# Patient Record
Sex: FEMALE | Race: WHITE | NOT HISPANIC OR LATINO | Employment: FULL TIME | URBAN - METROPOLITAN AREA
[De-identification: names, ages, dates, MRNs, and addresses within clinical notes are randomized per-mention and may not be internally consistent; named-entity substitution may affect disease eponyms.]

---

## 2018-01-17 ENCOUNTER — APPOINTMENT (OUTPATIENT)
Dept: WOMENS IMAGING | Facility: HOSPITAL | Age: 43
End: 2018-01-17

## 2018-01-17 PROCEDURE — MDREVSPNEW: Performed by: RADIOLOGY

## 2018-01-17 PROCEDURE — 77066 DX MAMMO INCL CAD BI: CPT | Performed by: RADIOLOGY

## 2018-01-17 PROCEDURE — 76641 ULTRASOUND BREAST COMPLETE: CPT | Performed by: RADIOLOGY

## 2018-01-17 PROCEDURE — 77062 BREAST TOMOSYNTHESIS BI: CPT | Performed by: RADIOLOGY

## 2018-01-17 PROCEDURE — G0279 TOMOSYNTHESIS, MAMMO: HCPCS | Performed by: RADIOLOGY

## 2018-04-10 ENCOUNTER — OFFICE VISIT (OUTPATIENT)
Dept: FAMILY MEDICINE CLINIC | Facility: CLINIC | Age: 43
End: 2018-04-10

## 2018-04-10 VITALS
HEIGHT: 68 IN | SYSTOLIC BLOOD PRESSURE: 116 MMHG | DIASTOLIC BLOOD PRESSURE: 78 MMHG | RESPIRATION RATE: 13 BRPM | OXYGEN SATURATION: 99 % | BODY MASS INDEX: 22.88 KG/M2 | WEIGHT: 151 LBS | HEART RATE: 68 BPM

## 2018-04-10 DIAGNOSIS — Z87.42 HISTORY OF MENORRHAGIA: ICD-10-CM

## 2018-04-10 DIAGNOSIS — Z13.220 SCREENING FOR HYPERLIPIDEMIA: ICD-10-CM

## 2018-04-10 DIAGNOSIS — Z00.00 ANNUAL PHYSICAL EXAM: Primary | ICD-10-CM

## 2018-04-10 DIAGNOSIS — R63.5 WEIGHT GAIN: ICD-10-CM

## 2018-04-10 DIAGNOSIS — Z23 NEED FOR VACCINATION: ICD-10-CM

## 2018-04-10 DIAGNOSIS — Z13.1 SCREENING FOR DIABETES MELLITUS: ICD-10-CM

## 2018-04-10 PROBLEM — L70.0 ACNE VULGARIS: Status: ACTIVE | Noted: 2018-04-10

## 2018-04-10 LAB
ALBUMIN SERPL-MCNC: 4.9 G/DL (ref 3.5–5.2)
ALBUMIN/GLOB SERPL: 2 G/DL
ALP SERPL-CCNC: 56 U/L (ref 39–117)
ALT SERPL-CCNC: 13 U/L (ref 1–33)
AST SERPL-CCNC: 14 U/L (ref 1–32)
BASOPHILS # BLD AUTO: 0.02 10*3/MM3 (ref 0–0.2)
BASOPHILS NFR BLD AUTO: 0.4 % (ref 0–1.5)
BILIRUB SERPL-MCNC: 0.6 MG/DL (ref 0.1–1.2)
BUN SERPL-MCNC: 13 MG/DL (ref 6–20)
BUN/CREAT SERPL: 14.1 (ref 7–25)
CALCIUM SERPL-MCNC: 9.4 MG/DL (ref 8.6–10.5)
CHLORIDE SERPL-SCNC: 100 MMOL/L (ref 98–107)
CHOLEST SERPL-MCNC: 191 MG/DL (ref 0–200)
CO2 SERPL-SCNC: 28.4 MMOL/L (ref 22–29)
CREAT SERPL-MCNC: 0.92 MG/DL (ref 0.57–1)
EOSINOPHIL # BLD AUTO: 0.04 10*3/MM3 (ref 0–0.7)
EOSINOPHIL NFR BLD AUTO: 0.8 % (ref 0.3–6.2)
ERYTHROCYTE [DISTWIDTH] IN BLOOD BY AUTOMATED COUNT: 13.9 % (ref 11.7–13)
GFR SERPLBLD CREATININE-BSD FMLA CKD-EPI: 67 ML/MIN/1.73
GFR SERPLBLD CREATININE-BSD FMLA CKD-EPI: 81 ML/MIN/1.73
GLOBULIN SER CALC-MCNC: 2.5 GM/DL
GLUCOSE SERPL-MCNC: 92 MG/DL (ref 65–99)
HBA1C MFR BLD: 5.34 % (ref 4.8–5.6)
HCT VFR BLD AUTO: 43 % (ref 35.6–45.5)
HDLC SERPL-MCNC: 65 MG/DL (ref 40–60)
HGB BLD-MCNC: 13.4 G/DL (ref 11.9–15.5)
IMM GRANULOCYTES # BLD: 0 10*3/MM3 (ref 0–0.03)
IMM GRANULOCYTES NFR BLD: 0 % (ref 0–0.5)
LDLC SERPL CALC-MCNC: 117 MG/DL (ref 0–100)
LDLC/HDLC SERPL: 1.79 {RATIO}
LYMPHOCYTES # BLD AUTO: 1.26 10*3/MM3 (ref 0.9–4.8)
LYMPHOCYTES NFR BLD AUTO: 26.4 % (ref 19.6–45.3)
MCH RBC QN AUTO: 32.3 PG (ref 26.9–32)
MCHC RBC AUTO-ENTMCNC: 31.2 G/DL (ref 32.4–36.3)
MCV RBC AUTO: 103.6 FL (ref 80.5–98.2)
MONOCYTES # BLD AUTO: 0.32 10*3/MM3 (ref 0.2–1.2)
MONOCYTES NFR BLD AUTO: 6.7 % (ref 5–12)
NEUTROPHILS # BLD AUTO: 3.14 10*3/MM3 (ref 1.9–8.1)
NEUTROPHILS NFR BLD AUTO: 65.7 % (ref 42.7–76)
PLATELET # BLD AUTO: 256 10*3/MM3 (ref 140–500)
POTASSIUM SERPL-SCNC: 4.2 MMOL/L (ref 3.5–5.2)
PROT SERPL-MCNC: 7.4 G/DL (ref 6–8.5)
RBC # BLD AUTO: 4.15 10*6/MM3 (ref 3.9–5.2)
SODIUM SERPL-SCNC: 140 MMOL/L (ref 136–145)
TRIGL SERPL-MCNC: 47 MG/DL (ref 0–150)
TSH SERPL DL<=0.005 MIU/L-ACNC: 1.25 MIU/ML (ref 0.27–4.2)
VLDLC SERPL CALC-MCNC: 9.4 MG/DL (ref 5–40)
WBC # BLD AUTO: 4.78 10*3/MM3 (ref 4.5–10.7)

## 2018-04-10 PROCEDURE — 90471 IMMUNIZATION ADMIN: CPT | Performed by: FAMILY MEDICINE

## 2018-04-10 PROCEDURE — 99386 PREV VISIT NEW AGE 40-64: CPT | Performed by: FAMILY MEDICINE

## 2018-04-10 PROCEDURE — 90715 TDAP VACCINE 7 YRS/> IM: CPT | Performed by: FAMILY MEDICINE

## 2018-04-10 NOTE — PROGRESS NOTES
Subjective   Geri Romeo is a 42 y.o. female. Patient is here today for   Chief Complaint   Patient presents with   • Annual Exam     Physical   • Establish Care         HPI    It has been about 3 yr since her last annual physical.  Her previous PCP went to a [facility] model a few years ago.      Health Habits:  Dental Exam. not up to date - about 9 months ago  Eye Exam. up to date  Exercise: 2 times/week.  Current exercise activities include: pure barre   Recent diagnostic mammogram WNL per pt report  She has had coffee with cream this morning, but no solid food, and she would like to get labs this morning  Last pap smear was normal about 3-4 yr ago  No hx of abnormal pap smears  She would like to return to clinic for a pap smear within the next 2 months (she needs to get to a work meeting and does not have time to do a pap today)  Nexplanon placed for menorrhagia in November 2017  She has gained about 8 lb since ht implant was placed  No menses in the last few months, which is a welcome change  She previously took Spironolactone for acne and would like to resume it (no Rx needed because she has left over medication at home)  No hx of STIs;  and monogamous  No mental health concerns  Flu shot is UTD but Tdap needed    Her  has shingles but she does not have any rash or burning/tingling.      The following portions of the patient's history were reviewed and updated as appropriate: allergies, current medications, past family history, past medical history, past social history, past surgical history and problem list.    Past Medical History:   Diagnosis Date   • Acne    • Allergic    • Menorrhagia       Past Surgical History:   Procedure Laterality Date   • APPENDECTOMY     • VAGINAL DELIVERY      x 2     Family History   Problem Relation Age of Onset   • Coronary artery disease Maternal Grandfather    • Glaucoma Paternal Grandfather        No Known Allergies     Social History     Social History Main  Topics   • Smoking status: Never Smoker   • Smokeless tobacco: Never Used   • Alcohol use Yes      Comment: 2-3 times a week   • Drug use: No   • Sexual activity: Yes     Partners: Male     Birth control/ protection: Implant      Comment:  has had a vascectomy     Social History Narrative    Works in [employer] for [employer]  Lives at home with her , 2 daughters, and 1 dog.        Medications:  Nexplanon implant  Spironolactone (dose unknown)       Review of Systems   Constitutional: Positive for unexpected weight change (8 lb gain in the last 6 months). Negative for appetite change and fever.   HENT: Negative for congestion, rhinorrhea and sore throat.    Eyes: Negative for pain and visual disturbance.   Respiratory: Negative for cough, chest tightness, shortness of breath and wheezing.    Cardiovascular: Negative for chest pain, palpitations and leg swelling.   Gastrointestinal: Negative for abdominal pain, blood in stool, constipation, diarrhea, nausea and vomiting.   Genitourinary: Negative for dysuria, genital sores and vaginal bleeding.        Menses have stopped with Nexplanon   Musculoskeletal: Negative for arthralgias and myalgias.   Skin: Negative for pallor and rash.   Neurological: Negative for dizziness and headaches.   Psychiatric/Behavioral: Negative for dysphoric mood. The patient is not nervous/anxious.          Objective       Vitals:    04/10/18 0902   BP: 116/78   Pulse: 68   Resp: 13   SpO2: 99%   BMI 23    Physical Exam   Constitutional: She appears well-developed and well-nourished. No distress.   HENT:   Head: Normocephalic and atraumatic.   Neck: No thyromegaly present.   Cardiovascular: Normal rate, regular rhythm and normal heart sounds.  Exam reveals no gallop and no friction rub.    No murmur heard.  Pulses:       Radial pulses are 2+ on the right side, and 2+ on the left side.   Pulmonary/Chest: Effort normal and breath sounds normal. No respiratory distress. She has no wheezes. She has  no rhonchi. She has no rales.   Abdominal: Soft. Bowel sounds are normal. She exhibits no distension. There is no tenderness.   Musculoskeletal: She exhibits no edema.   Lymphadenopathy:     She has no cervical adenopathy.   Neurological: She has normal strength. Gait normal.   Skin: Skin is warm and dry.   Psychiatric: She has a normal mood and affect. Her behavior is normal.       ASSESSMENT/PLAN          Visit Diagnoses     Annual physical exam    -  Primary    Relevant Orders    Tdap Vaccine Greater Than or Equal To 6yo IM    CBC & Differential    Comprehensive Metabolic Panel    Hemoglobin A1c    Lipid Panel With LDL / HDL Ratio    TSH    Screening for diabetes mellitus        Relevant Orders    Comprehensive Metabolic Panel    Hemoglobin A1c    Screening for hyperlipidemia        Relevant Orders    Lipid Panel With LDL / HDL Ratio    Weight gain        Relevant Orders    CBC & Differential    Comprehensive Metabolic Panel    Hemoglobin A1c    Lipid Panel With LDL / HDL Ratio    TSH    History of menorrhagia        Relevant Orders    CBC & Differential  TSH      Need for vaccination        Relevant Orders    Tdap Vaccine Greater Than or Equal To 6yo IM            Patient Instructions   Please forward a copy of your recent mammogram to this office.    Don't forget to schedule a dental exam soon.      Please call with your Spironolactone dose.          Return in about 1 month (around 5/10/2018) for pap smear.

## 2018-04-16 ENCOUNTER — TELEPHONE (OUTPATIENT)
Dept: FAMILY MEDICINE CLINIC | Facility: CLINIC | Age: 43
End: 2018-04-16

## 2018-04-16 NOTE — TELEPHONE ENCOUNTER
Called and spoke with patient to let her know her lab results and let her know that we will check her B12 at her next appointment.     ----- Message from Angi Fuller MD sent at 4/15/2018  4:19 PM EDT -----  Please contact pt and let her know that her red blood cells are enlarged, but that she is not anemic.  I would like to do some follow up blood tests (B12 and folate) to see if we can determine the cause of the red blood cell enlargement (macrocytosis).  She does not need to fast.  We can plan to draw the blood at her next appointment when she returns for a pap smear.  Cholesterol was very slightly elevated.  The rest of her labs look good; blood sugar,  kidney function, liver enzymes, and thyroid hormone were normal.

## 2019-02-28 RX ORDER — OSELTAMIVIR PHOSPHATE 75 MG/1
75 CAPSULE ORAL DAILY
Qty: 10 CAPSULE | Refills: 0 | Status: SHIPPED | OUTPATIENT
Start: 2019-02-28

## 2022-04-11 ENCOUNTER — APPOINTMENT (OUTPATIENT)
Dept: WOMENS IMAGING | Facility: HOSPITAL | Age: 47
End: 2022-04-11

## 2022-04-11 PROCEDURE — 77063 BREAST TOMOSYNTHESIS BI: CPT | Performed by: RADIOLOGY

## 2022-04-11 PROCEDURE — 77067 SCR MAMMO BI INCL CAD: CPT | Performed by: RADIOLOGY

## 2022-05-26 ENCOUNTER — LAB REQUISITION (OUTPATIENT)
Dept: LAB | Facility: HOSPITAL | Age: 47
End: 2022-05-26

## 2022-05-26 DIAGNOSIS — Z00.00 ENCOUNTER FOR GENERAL ADULT MEDICAL EXAMINATION WITHOUT ABNORMAL FINDINGS: ICD-10-CM

## 2022-05-26 PROCEDURE — 88305 TISSUE EXAM BY PATHOLOGIST: CPT | Performed by: OBSTETRICS & GYNECOLOGY

## 2022-05-28 LAB
LAB AP CASE REPORT: NORMAL
LAB AP CLINICAL INFORMATION: NORMAL
PATH REPORT.FINAL DX SPEC: NORMAL
PATH REPORT.GROSS SPEC: NORMAL

## 2024-01-26 ENCOUNTER — APPOINTMENT (OUTPATIENT)
Dept: WOMENS IMAGING | Facility: HOSPITAL | Age: 49
End: 2024-01-26
Payer: COMMERCIAL

## 2024-01-26 PROCEDURE — G0279 TOMOSYNTHESIS, MAMMO: HCPCS | Performed by: RADIOLOGY

## 2024-01-26 PROCEDURE — 77062 BREAST TOMOSYNTHESIS BI: CPT | Performed by: RADIOLOGY

## 2024-01-26 PROCEDURE — 77066 DX MAMMO INCL CAD BI: CPT | Performed by: RADIOLOGY

## 2024-01-26 PROCEDURE — 76642 ULTRASOUND BREAST LIMITED: CPT | Performed by: RADIOLOGY

## 2024-02-06 ENCOUNTER — APPOINTMENT (OUTPATIENT)
Dept: WOMENS IMAGING | Facility: HOSPITAL | Age: 49
End: 2024-02-06
Payer: COMMERCIAL

## 2024-02-06 ENCOUNTER — RESEARCH ENCOUNTER (OUTPATIENT)
Dept: ONCOLOGY | Facility: HOSPITAL | Age: 49
End: 2024-02-06
Payer: COMMERCIAL

## 2024-02-06 ENCOUNTER — LAB REQUISITION (OUTPATIENT)
Dept: LAB | Facility: HOSPITAL | Age: 49
End: 2024-02-06
Payer: COMMERCIAL

## 2024-02-06 DIAGNOSIS — N63.0 UNSPECIFIED LUMP IN UNSPECIFIED BREAST: ICD-10-CM

## 2024-02-06 PROCEDURE — 88305 TISSUE EXAM BY PATHOLOGIST: CPT | Performed by: OBSTETRICS & GYNECOLOGY

## 2024-02-06 PROCEDURE — 88112 CYTOPATH CELL ENHANCE TECH: CPT | Performed by: OBSTETRICS & GYNECOLOGY

## 2024-02-06 PROCEDURE — 76942 ECHO GUIDE FOR BIOPSY: CPT | Performed by: RADIOLOGY

## 2024-02-06 PROCEDURE — 19000 PUNCTURE ASPIR CYST BREAST: CPT | Performed by: RADIOLOGY

## 2024-02-06 NOTE — RESEARCH
Informed Consent worksheet for the protocol: [employer] 20-08 Prospective Case Collection Study for [employer]    Consent version: IRB approval dated November 3,2023  Protocol version: 8.0, IRB approval 08/25/2023  Subject -[id number]      The patient was pre screened by phone call and identified as a candidate for the [employer] 20-08 Prospective Case Collection Study for [employer], and voiced interest in participating in the study.    The following people were present at the consent conference: Location at the [facility]  Patient: Geri Romeo   : Kendra Santoyo   Other:       The following was discussed with the patient prior to signing the informed consent.    The study purpose   Procedures, including identification of an experimental investigational device  Duration of study participation  New Information  Risks and Discomforts  Any known side effects when applicable    Alternative treatment  Benefits   Costs/Payment   Patient's accountability and responsibilities    The voluntary nature of research participants     Right to withdraw consent    The withdrawal process    Confidentiality   Authorization to use and disclose information for research purposes - Including who can view information and the types of information shared.    The patient was informed of what to do in case of an illness or injury resulting from the study and who to contact for more trial information, or information on rights and welfare as a research volunteer.     The patient was given opportunity for questions. The patient verbalizes understanding and is willing to sign the informed consent.     After all questions were satisfied the patient signed the informed consent and a copy of the signed main informed consent form were given to the patient.    No study-specific procedures were completed prior to patient signing study informed consent form(s)    The principal  Investigator  is aware of the subject's participation status.    Kendra Santoyo   Clinical Research Coordinator

## 2024-02-09 LAB
DX PRELIMINARY: NORMAL
LAB AP CASE REPORT: NORMAL
LAB AP DIAGNOSIS COMMENT: NORMAL
PATH REPORT.FINAL DX SPEC: NORMAL
PATH REPORT.GROSS SPEC: NORMAL

## 2024-07-09 ENCOUNTER — APPOINTMENT (OUTPATIENT)
Dept: WOMENS IMAGING | Facility: HOSPITAL | Age: 49
End: 2024-07-09
Payer: COMMERCIAL

## 2024-07-09 PROCEDURE — 77065 DX MAMMO INCL CAD UNI: CPT | Performed by: RADIOLOGY

## 2024-07-09 PROCEDURE — 77061 BREAST TOMOSYNTHESIS UNI: CPT | Performed by: RADIOLOGY

## 2024-07-09 PROCEDURE — G0279 TOMOSYNTHESIS, MAMMO: HCPCS | Performed by: RADIOLOGY

## 2024-07-09 PROCEDURE — 76642 ULTRASOUND BREAST LIMITED: CPT | Performed by: RADIOLOGY

## 2024-07-22 ENCOUNTER — LAB REQUISITION (OUTPATIENT)
Dept: LAB | Facility: HOSPITAL | Age: 49
End: 2024-07-22
Payer: COMMERCIAL

## 2024-07-22 ENCOUNTER — APPOINTMENT (OUTPATIENT)
Dept: WOMENS IMAGING | Facility: HOSPITAL | Age: 49
End: 2024-07-22
Payer: COMMERCIAL

## 2024-07-22 DIAGNOSIS — R92.1 MAMMOGRAPHIC CALCIFICATION FOUND ON DIAGNOSTIC IMAGING OF BREAST: ICD-10-CM

## 2024-07-22 PROCEDURE — 88341 IMHCHEM/IMCYTCHM EA ADD ANTB: CPT | Performed by: OBSTETRICS & GYNECOLOGY

## 2024-07-22 PROCEDURE — 88305 TISSUE EXAM BY PATHOLOGIST: CPT | Performed by: OBSTETRICS & GYNECOLOGY

## 2024-07-22 PROCEDURE — 88342 IMHCHEM/IMCYTCHM 1ST ANTB: CPT | Performed by: OBSTETRICS & GYNECOLOGY

## 2024-07-22 PROCEDURE — A4648 IMPLANTABLE TISSUE MARKER: HCPCS | Performed by: RADIOLOGY

## 2024-07-22 PROCEDURE — C1819 TISSUE LOCALIZATION-EXCISION: HCPCS | Performed by: RADIOLOGY

## 2024-07-22 PROCEDURE — 19000 PUNCTURE ASPIR CYST BREAST: CPT | Performed by: RADIOLOGY

## 2024-07-22 PROCEDURE — 19081 BX BREAST 1ST LESION STRTCTC: CPT | Performed by: RADIOLOGY

## 2024-07-24 ENCOUNTER — TELEPHONE (OUTPATIENT)
Dept: SURGERY | Facility: CLINIC | Age: 49
End: 2024-07-24
Payer: COMMERCIAL

## 2024-07-24 DIAGNOSIS — N60.91 ATYPICAL DUCTAL HYPERPLASIA OF RIGHT BREAST: Primary | ICD-10-CM

## 2024-07-24 LAB
DX PRELIMINARY: NORMAL
LAB AP CASE REPORT: NORMAL
LAB AP CLINICAL INFORMATION: NORMAL
LAB AP SPECIAL STAINS: NORMAL
PATH REPORT.FINAL DX SPEC: NORMAL
PATH REPORT.GROSS SPEC: NORMAL

## 2024-07-29 ENCOUNTER — TELEPHONE (OUTPATIENT)
Dept: SURGERY | Facility: CLINIC | Age: 49
End: 2024-07-29
Payer: COMMERCIAL

## 2024-07-29 DIAGNOSIS — N60.91 ATYPICAL DUCTAL HYPERPLASIA OF RIGHT BREAST: Primary | ICD-10-CM

## 2024-07-29 NOTE — TELEPHONE ENCOUNTER
Called PT and left her a voicemail about the following appts.    MRI: 08/15/2024 at 10:00 AM    New PT appt: 08/21/2024 at 09:00 AM    Appt reminders and new PT paper work sent out in the mail on 07/29/2024.    MEN

## 2024-08-13 ENCOUNTER — TELEPHONE (OUTPATIENT)
Dept: SURGERY | Facility: CLINIC | Age: 49
End: 2024-08-13
Payer: COMMERCIAL

## 2024-08-13 NOTE — TELEPHONE ENCOUNTER
Called PT to reschedule her new PT appt with.    New PT appt: 08/28/2024 at 02:45 PM    Appt reminder sent out on 08/13/2024    PT gave a verbal understanding of appt date, time, and location.    MEN

## 2024-08-15 ENCOUNTER — HOSPITAL ENCOUNTER (OUTPATIENT)
Dept: MRI IMAGING | Facility: HOSPITAL | Age: 49
Discharge: HOME | End: 2024-08-15
Admitting: STUDENT IN AN ORGANIZED HEALTH CARE EDUCATION/TRAINING PROGRAM
Payer: COMMERCIAL

## 2024-08-15 DIAGNOSIS — N60.91 ATYPICAL DUCTAL HYPERPLASIA OF RIGHT BREAST: ICD-10-CM

## 2024-08-15 PROCEDURE — 77049 MRI BREAST C-+ W/CAD BI: CPT

## 2024-08-15 PROCEDURE — 0 GADOBENATE DIMEGLUMINE 529 MG/ML SOLUTION: Performed by: STUDENT IN AN ORGANIZED HEALTH CARE EDUCATION/TRAINING PROGRAM

## 2024-08-15 PROCEDURE — A9577 INJ MULTIHANCE: HCPCS | Performed by: STUDENT IN AN ORGANIZED HEALTH CARE EDUCATION/TRAINING PROGRAM

## 2024-08-15 RX ADMIN — GADOBENATE DIMEGLUMINE 14 ML: 529 INJECTION, SOLUTION INTRAVENOUS at 10:43

## 2024-08-19 ENCOUNTER — TELEPHONE (OUTPATIENT)
Dept: SURGERY | Facility: CLINIC | Age: 49
End: 2024-08-19
Payer: COMMERCIAL

## 2024-08-19 NOTE — TELEPHONE ENCOUNTER
Tried to call patient left a voicemail regarding MRI results and will discuss biopsy pathology and plan at her follow up appt.    [place]

## 2024-08-28 ENCOUNTER — TELEPHONE (OUTPATIENT)
Dept: SURGERY | Facility: CLINIC | Age: 49
End: 2024-08-28
Payer: COMMERCIAL

## 2024-08-28 ENCOUNTER — OFFICE VISIT (OUTPATIENT)
Dept: SURGERY | Facility: CLINIC | Age: 49
End: 2024-08-28
Payer: COMMERCIAL

## 2024-08-28 ENCOUNTER — HOSPITAL ENCOUNTER (OUTPATIENT)
Dept: SURGERY | Facility: HOSPITAL | Age: 49
Discharge: HOME | End: 2024-08-28
Admitting: STUDENT IN AN ORGANIZED HEALTH CARE EDUCATION/TRAINING PROGRAM
Payer: COMMERCIAL

## 2024-08-28 ENCOUNTER — TRANSCRIBE ORDERS (OUTPATIENT)
Dept: SURGERY | Facility: CLINIC | Age: 49
End: 2024-08-28

## 2024-08-28 VITALS
WEIGHT: 151 LBS | HEART RATE: 85 BPM | BODY MASS INDEX: 22.88 KG/M2 | RESPIRATION RATE: 18 BRPM | SYSTOLIC BLOOD PRESSURE: 124 MMHG | OXYGEN SATURATION: 97 % | DIASTOLIC BLOOD PRESSURE: 76 MMHG | HEIGHT: 68 IN

## 2024-08-28 DIAGNOSIS — N60.91 ATYPICAL DUCTAL HYPERPLASIA OF RIGHT BREAST: ICD-10-CM

## 2024-08-28 DIAGNOSIS — N60.91 ATYPICAL DUCTAL HYPERPLASIA OF RIGHT BREAST: Primary | ICD-10-CM

## 2024-08-28 DIAGNOSIS — N60.19 CYSTIC BREAST, UNSPECIFIED LATERALITY: ICD-10-CM

## 2024-08-28 DIAGNOSIS — N60.01 SOLITARY CYST OF RIGHT BREAST: ICD-10-CM

## 2024-08-28 PROCEDURE — 88112 CYTOPATH CELL ENHANCE TECH: CPT | Performed by: STUDENT IN AN ORGANIZED HEALTH CARE EDUCATION/TRAINING PROGRAM

## 2024-08-28 PROCEDURE — 88305 TISSUE EXAM BY PATHOLOGIST: CPT | Performed by: STUDENT IN AN ORGANIZED HEALTH CARE EDUCATION/TRAINING PROGRAM

## 2024-08-28 NOTE — LETTER
August 29, 2024     Gisele Britton MD  [address]  [address]  [facility] [address]    Patient: Geri Romeo   YOB: 1975   Date of Visit: 8/28/2024     Dear Gisele Britton MD:       Thank you for referring Geri Romeo to me for evaluation. Below are the relevant portions of my assessment and plan of care.    If you have questions, please do not hesitate to call me. I look forward to following Geri along with you.         Sincerely,        Coleen Nunn MD        CC: No Recipients    Coleen Nunn MD  08/29/24 1005  Sign when Signing Visit  [facility]     Referring Provider: Yanet Chaidez MD     Chief complaint: newly diagnosed atypical hyperplasia     Subjective  HPI: Ms. Geri Romeo is a 48 y.o. yo woman, seen at the request of Yanet Chaidez MD, for a new diagnosis of  right breast ADH.      This was initially detected as an imaging abnormality on imaging for new palpable mass which was found to be a collection of cysts. She has a history of cystic breasts requiring aspirations.    She denies any pain, skin changes, or nipple discharge.  She endorses a new palpable mass in her right superior breast that she first noticed over the weekend. Has been sore in that area but no other issues.     She is otherwise healthy and denies any medications regularly.     She denies any family history of breast or ovarian cancer.     She was joined today in clinic by her .  She gave consent for her  to be present during her examination and participate in the discussion.    Review of Systems   Constitutional: Negative.  Negative for appetite change, fatigue and fever.   HENT:  Negative.     Eyes: Negative.    Respiratory: Negative.  Negative for cough and shortness of breath.    Cardiovascular: Negative.    Gastrointestinal: Negative.  Negative for abdominal distention, diarrhea and nausea.   Endocrine: Negative.    Genitourinary: Negative.     Musculoskeletal: Negative.   Negative for arthralgias and back pain.   Skin: Negative.    Neurological: Negative.  Negative for dizziness, headaches and speech difficulty.   Hematological: Negative.    Psychiatric/Behavioral: Negative.  Negative for decreased concentration and sleep disturbance.        Medications:  No current outpatient medications on file.    Allergies:  No Known Allergies    Medical history:  Past Medical History:   Diagnosis Date   • Acne    • Allergic    • Menorrhagia    • Scalp mass        Surgical History:  Past Surgical History:   Procedure Laterality Date   • APPENDECTOMY     • OTHER SURGICAL HISTORY      scalp mass resection   • VAGINAL DELIVERY      x 2       Family History:  Family History   Problem Relation Age of Onset   • Coronary artery disease Maternal Grandfather    • Glaucoma Paternal Grandfather        Cancer Family History: Age of diagnosis/Age at death OR Age as of today  Breast Cancer: Denies  Ovarian Cancer: Denies  Pancreatic Cancer: Denies  Prostate Cancer: Denies  Colon Cancer: Denies  Lung Cancer: Denies     Social History:   Social History     Socioeconomic History   • Marital status: Unknown   Tobacco Use   • Smoking status: Never   • Smokeless tobacco: Never   Vaping Use   • Vaping status: Never Used   Substance and Sexual Activity   • Alcohol use: Yes     Comment: 2-3 times a week   • Drug use: No   • Sexual activity: Yes     Partners: Male     Birth control/protection: Implant     Comment:  has had a vascectomy       She lives with her  and 2 kids.   She is employed at [employer].       GYNECOLOGIC HISTORY:   . P: 2. AB: 0.  Last menstrual period: Had ablation, Unsure.  Age at menarche: 12  Age at first childbirth: 26  Lactation: Yes, Unsure of how long.  Age at menopause: N/A  Total years of oral contraceptive use: 4  Total years of hormone replacement therapy: 0      Objective  PHYSICAL EXAMINATION  This exam was chaperoned by: Ainsley  /76   Pulse 85   Resp 18   Ht 172.7  "cm (67.99\")   Wt 68.5 kg (151 lb)   SpO2 97%   BMI 22.96 kg/m²   ECOG 0 - Asymptomatic  General: NAD, well appearing  Psych: a&o x 3, normal mood and affect  Eyes: EOMI, no scleral icterus  ENMT: neck supple without masses or thyromegaly, mucus membranes moist  Resp: normal effort  CV: RRR, no edema   GI: soft, NT, ND  MSK: normal gait, normal ROM in bilateral shoulders  Lymph nodes:  no cervical, supraclavicular or axillary lymphadenopathy  Breast: symmetric, large volume, ptotic. Dense breast tissue in anterior breast, nodular.   Right: bulging mass in upper inner quadrant near areolar edge. No  skin changes, or nipple abnormalities.  Left:  No visible abnormalities on inspection while seated, with arms raised or hands on hips. No masses, skin changes, or nipple abnormalities.           Imaging - documented images below have been personally reviewed:  1/26/2024 bilateral diagnostic mammogram ([facility])  Breasts are extremely dense which lowers sensitivity of mammography  Finding 1: Isodense oval mass obscured margins seen in the left breast at 6:00 6 cm of the nipple.  Mass correlates to the palpable abnormality in the left breast at 6:00  Finding 2: Isodense oval masses with circumscribed margins seen in both breast.  Some have enlarged in size and some of decreased in size since prior exams.  This is a typically benign pattern consistent with a history of cyst  Left breast ultrasound  Finding 1 ultrasound shows an oval partially complex mixed cystic and solid lesion with partially defined margins measuring 10 x 9 x 7 mm in the left breast at 6:00 6 cm in the nipple  Finding 3: Oval parallel anechoic simple cyst with circumscribed margins in the left breast at 5:00 5 cm the nipple.  Impression  Finding 1: Complex mixed cystic and solid lesion the left breast is suspicious ultrasound-guided biopsy is recommended  Finding 2: Mass in both breast are benign negative  Finding 3: Simple cyst in the left breast at 5:00 5 " centers from the nipple is benign negative  BI-RADS 4B    2/6/2024 ultrasound-guided cyst aspiration  Patient's left breast was prepped and the cyst was visualized under ultrasound guidance.  A 21-gauge needle was inserted into the 6:00 cyst and 1/2 cc of yellow fluid extended in the syringe.  The walls collapsed.  There was complete resolution of the cyst.  The fluid was sent for cytology.  Cytology showed generated metaplastic cells, foamy histiocytes and neutrophils  Impression-cytology included degenerated metaplastic cells, foamy histiocytes, neutrophils, and cystic debris's.  A 6-month follow-up ultrasound is recommended    7/9/2024 right diagnostic mammogram: Palpable lump or thickening in the right breast and post cyst aspiration in the left breast at 6:00.  The breast is extremely dense  Finding 1: Multiple oval masses of varying size obscured margins in the right breast  Finding 2: Oval mass with obscured margins in the right breast, correlates with the pinpointed palpable area at 1230 o'clock  Finding 3: Amorphous calcifications measuring 5 mm with grouped distribution in the posterior one third region of the right breast at 10:00.  Bilateral breast ultrasound  Finding 2: Cluster of large simple cyst measuring at least 53 x 25 x 55 mm seen in the right breast  No evidence of any solid masses or other abnormalities in the left breast  Impression:  Finding 1-multiple similar masses in the right breast are stable and benign  Finding 2-palpable masses in the right breast are benign, aspiration can be performed for symptom relief  Finding 3-calcifications in the right breast are suspicious stereotactic biopsy is recommended  BI-RADS 4A    7/22/2024 stereotactic biopsy  The patient's right breast at 10 o'clock position was isolated in the stereotactic unit and the calcifications were localized.  Using an inferior approach 9 core biopsy specimens were obtained using a 9 gauge vacuum-assisted device.  The  specimens were radiographed confirming calcifications within the sample.  A Top-Hat shaped biopsy clip was deployed within the biopsy bed and the needle was withdrawn.  2 view mammogram shows clip migrated approximately 3 cm anterior in the ML view from expected biopsy site and the small to moderate size hematoma exists.  Impression  Technically successful right stereotactic biopsy, mircocalcifications with ADH and FEA. Pathology is concordant.    7/22/2024 right ultrasound-guided cyst aspiration  The right breast at 12:00 cluster of cyst were identified and using an 18-gauge needle 45 mL of yellow fluid extended into the syringe.  The walls of the cyst collapsed there was complete sonographic resolution.  The fluid was discarded.  Patient tolerated the procedure well  Impression: Technically successful right ultrasound cyst aspiration as described    8/15/24 breast MRI  There is heterogeneous fibroglandular tissue. There is marked  background parenchymal enhancement, right greater than left, which limits the sensitivity for detection of invasive malignancy and DCIS.   There are numerous bilateral breast cysts, some of which demonstrate intrinsic T1 hyperintense signal consistent with complicated proteinaceous/hemorrhagic cysts. Additionally, there is intrinsic T1 hyperintense signal within multiple ducts in both breasts, right greater than left, consistent with proteinaceous and/or hemorrhagic contents.     RIGHT BREAST:    At 8-9 o'clock in the posterior right breast, 10 cm posterior to the  nipple, there is a 1.5 cm AP dimension, 1.7 cm transverse dimension, 1.5vcm craniocaudal dimension T1 hyperintense postbiopsy hematoma representing the site of biopsy-proven atypia. A focus of susceptibility from a biopsy clip (TOPHAT) is located at 9:00 in the posterior right breast, 9.4 cm posterior to the nipple, approximately 0.8 cm anterior and superior to the superior aspect of the postbiopsy hematoma. The clip was  previously noted to be displaced from the biopsy site on the postbiopsy mammogram. No suspicious enhancing mass or area of non-mass enhancement is identified at the biopsy site or elsewhere within the right breast.  The visualized axilla is within normal limits.     LEFT BREAST:    No suspicious enhancing mass or area of non-mass enhancement is  identified.  The visualized axilla is within normal limits.     EXTRAMAMMARY FINDINGS:  There are no pathologically enlarged internal mammary chain lymph nodes on either side.    There are incompletely assessed T2 hyperintense hepatic lesions.     IMPRESSION AND RECOMMENDATION:  1.  A 1.7 cm post biopsy hematoma at 8-9 o'clock in the posterior right  breast represents the site of biopsy-proven atypia. The biopsy clip is  located approximately 0.8 cm from the periphery of the hematoma and was noted to be displaced on postbiopsy mammogram. Recommend surgical management with preoperative localization targeting the biopsy site.  2.  No MRI evidence of malignancy in the left breast.  BI-RADS Category 4:    Pathology:  7/22/24  1.  Breast, right, 10:00, for calcifications, biopsy (Top-Hat clip): Breast tissue with                -A.  Atypical ductal hyperplasia               -B.  Flat epithelial atypia               -C.  Microcalcifications associated with the atypical ductal hyperplasia, flat epithelial atypia and nonneoplastic tissue               -D.  Clustered cysts               -E.  Sclerosing adenosis               -F.  Fibroadenomatoid hyperplasia      8/28/24 Procedure  Diagnostic Ultrasound Breast, Targeted    Procedure: Diagnostic ultrasound,  Right breast  Indication: New enlarging lump in superior right breast   Comparisons: MRI 8/15/24,   Description of procedure: Using a 10MHz linear transducer, I scanned the breast at the area of interest.  Findings:  At 1:00, 4 cm FN, there is a hypoechoic, homogeneous mostly round but lobulated at the inferior edge and well  circumscribed margins cystic mass with posterior enhancement, measuring 3.6 x 3.4 cm in antiradial dimension and 3.4 x 5.2 cm in radial dimension. The lesion is oriented oblique and is in a similar region to the previous area of aspiration. It is compressible.       Impression:   Probably benign, 5.2 cm cystic lesion in the rightbreast at 1:00, 4 cm from the nipple. This is amenable to ultrasound-guided aspiration. Correlation with exam/MMG. BI-RADS 3.       Procedure: Ultrasound-guided needle aspiration of right breast cyst  Indication: Enlarging Breast cyst  Location: right breast, 1:00, 4 cm FN  Consent: The risks, benefits, and alternatives to the procedure were discussed with the patient, who understood and wished to proceed. The risks described included, but were not limited to, bleeding, infection, incomplete drainage and recurrence requiring repeat aspiration.  Description of Procedure: After the patient was positioned supine on the procedure table, I located the fluid collection using ultrasound as described above. The area was prepped with alcohol and draped in sterile fashion. I anesthetized the breast skin with 1% lidocaine with epinephrine. I then anesthetized the underlying subcutaneous tissue and breast parenchyma surrounding the lesion with 1% lidocaine with epinephrine under ultrasound visualization and guidance. I then inserted a 18 G needle (attached to a syringe) from a inferolateral to superomedial approach into the fluid collection under ultrasound guidance and aspirated fluid. Approximately 23 mL of thin brown fluid was aspirated and sent for cytology. The collection significantly decreased in size, however did not completely disappear.   Marker placed: none  Tolerance: The patient tolerated the procedure well.   Disposition: will call with cytology results. MRI with signs of hemorraghic cysts throughout both breasts but more in the right than left. Likely benign. Incomplete aspiration to  allow for repeat identification if suspicion on cytology.            Assessment & Plan  Assessment:  48 y.o. F with a new diagnosis of right breast ADH    Discussion:  We discussed the clinical significance of ADH in terms of the potential for identifying associated malignancy at time of excision as well as its being a risk marker for future breast cancer in either breast.     We discussed management options in terms of ADH being a potential precursor, high risk benign lesion including excisional biopsy to evaluate for associated malignancy as well as surveillance. The excisional biopsy procedure was explained. Potential risks of surgery including bleeding, infection, hematoma and scar were reviewed and we also discussed the potential need to return to the operating room in the event of close margins or malignancy were identified in the surgical specimen in which case a definitive cancer operation would be recommended.       We also reviewed that atypical ductal hyperplasia is considered a risk factor for future development of a breast cancer in either breast, with the associated risk being approximately 2-4-fold general population risk. We discussed chemoprevention as an effective means of risk reduction, and medical oncology consultation to discuss chemoprevention was recommended. We also discussed recommendation for high risk breast surveillance going forward, with goal of early detection should she develop a breast cancer. This would include annual mammography as well as annual breast mri, alternating every 6 months and combined with breast exams.            Plan:    - OR analilia guided guided partial mastectomy for ADH  - follow up cytology results from aspiration. History of multiple cyst aspirations, MRI and Mammogram/US's with multiple cysts.   - Next Mammogram will be due - 1/27/25 ([facility]) and MRIs will be due in August 2025  - defer referral to medical oncology for discussion of risk reduction until after  pathology from surgery available  -Consents completed and signed. Discussed the risks and benefits of right breast partial mastectomy, analilia guided at length including estimated time for procedure, postoperative recovery, and postoperative instructions. Discussed the risks to include pain, bleeding, infection, nerve injury, numbness, seroma, skin complications including necrosis, breast asymmetry, need for re-excision, and scar.  Patient appears to understand and wishes to proceed.  All questions were answered.  - liver cysts to be follow up by PCP/OBGYNs office          Coleen Nunn MD  Breast Surgical Oncology    I spent 45 minutes caring for Geri on this date of service. This time includes time spent by me in the following activities: preparing for the visit, reviewing tests, obtaining and/or reviewing a separately obtained history, performing a medically appropriate examination and/or evaluation , counseling and educating the patient/family/caregiver, ordering medications, tests, or procedures, referring and communicating with other health care professionals , documenting information in the medical record, independently interpreting results and communicating that information with the patient/family/caregiver, and care coordination.  I spent 10 minutes on the separately reported service of this clinic visit for completion of the diagnostic ultrasound and aspiration. This time is not included in the time used to support the E/M service also reported today.        CC:  MD Gisele Ovalle, acting PCP for patient

## 2024-08-28 NOTE — PROGRESS NOTES
[facility]     Referring Provider: Yanet Chaidez MD     Chief complaint: newly diagnosed atypical hyperplasia     Subjective   HPI: Ms. Geri Romeo is a 48 y.o. yo woman, seen at the request of Yanet Chaidez MD, for a new diagnosis of  right breast ADH.      This was initially detected as an imaging abnormality on imaging for new palpable mass which was found to be a collection of cysts. She has a history of cystic breasts requiring aspirations.    She denies any pain, skin changes, or nipple discharge.  She endorses a new palpable mass in her right superior breast that she first noticed over the weekend. Has been sore in that area but no other issues.     She is otherwise healthy and denies any medications regularly.     She denies any family history of breast or ovarian cancer.     She was joined today in clinic by her .  She gave consent for her  to be present during her examination and participate in the discussion.    Review of Systems   Constitutional: Negative.  Negative for appetite change, fatigue and fever.   HENT:  Negative.     Eyes: Negative.    Respiratory: Negative.  Negative for cough and shortness of breath.    Cardiovascular: Negative.    Gastrointestinal: Negative.  Negative for abdominal distention, diarrhea and nausea.   Endocrine: Negative.    Genitourinary: Negative.     Musculoskeletal: Negative.  Negative for arthralgias and back pain.   Skin: Negative.    Neurological: Negative.  Negative for dizziness, headaches and speech difficulty.   Hematological: Negative.    Psychiatric/Behavioral: Negative.  Negative for decreased concentration and sleep disturbance.        Medications:  No current outpatient medications on file.    Allergies:  No Known Allergies    Medical history:  Past Medical History:   Diagnosis Date    Acne     Allergic     Menorrhagia     Scalp mass        Surgical History:  Past Surgical History:   Procedure Laterality Date    APPENDECTOMY  "     OTHER SURGICAL HISTORY      scalp mass resection    VAGINAL DELIVERY      x 2       Family History:  Family History   Problem Relation Age of Onset    Coronary artery disease Maternal Grandfather     Glaucoma Paternal Grandfather        Cancer Family History: Age of diagnosis/Age at death OR Age as of today  Breast Cancer: Denies  Ovarian Cancer: Denies  Pancreatic Cancer: Denies  Prostate Cancer: Denies  Colon Cancer: Denies  Lung Cancer: Denies     Social History:   Social History     Socioeconomic History    Marital status: Unknown   Tobacco Use    Smoking status: Never    Smokeless tobacco: Never   Vaping Use    Vaping status: Never Used   Substance and Sexual Activity    Alcohol use: Yes     Comment: 2-3 times a week    Drug use: No    Sexual activity: Yes     Partners: Male     Birth control/protection: Implant     Comment:  has had a vascectomy       She lives with her  and 2 kids.   She is employed at [employer].       GYNECOLOGIC HISTORY:   . P: 2. AB: 0.  Last menstrual period: Had ablation, Unsure.  Age at menarche: 12  Age at first childbirth: 26  Lactation: Yes, Unsure of how long.  Age at menopause: N/A  Total years of oral contraceptive use: 4  Total years of hormone replacement therapy: 0      Objective   PHYSICAL EXAMINATION  This exam was chaperoned by: Ainsley  /76   Pulse 85   Resp 18   Ht 172.7 cm (67.99\")   Wt 68.5 kg (151 lb)   SpO2 97%   BMI 22.96 kg/m²   ECOG 0 - Asymptomatic  General: NAD, well appearing  Psych: a&o x 3, normal mood and affect  Eyes: EOMI, no scleral icterus  ENMT: neck supple without masses or thyromegaly, mucus membranes moist  Resp: normal effort  CV: RRR, no edema   GI: soft, NT, ND  MSK: normal gait, normal ROM in bilateral shoulders  Lymph nodes:  no cervical, supraclavicular or axillary lymphadenopathy  Breast: symmetric, large volume, ptotic. Dense breast tissue in anterior breast, nodular.   Right: bulging mass in upper inner quadrant " near areolar edge. No  skin changes, or nipple abnormalities.  Left:  No visible abnormalities on inspection while seated, with arms raised or hands on hips. No masses, skin changes, or nipple abnormalities.           Imaging - documented images below have been personally reviewed:  1/26/2024 bilateral diagnostic mammogram ([facility])  Breasts are extremely dense which lowers sensitivity of mammography  Finding 1: Isodense oval mass obscured margins seen in the left breast at 6:00 6 cm of the nipple.  Mass correlates to the palpable abnormality in the left breast at 6:00  Finding 2: Isodense oval masses with circumscribed margins seen in both breast.  Some have enlarged in size and some of decreased in size since prior exams.  This is a typically benign pattern consistent with a history of cyst  Left breast ultrasound  Finding 1 ultrasound shows an oval partially complex mixed cystic and solid lesion with partially defined margins measuring 10 x 9 x 7 mm in the left breast at 6:00 6 cm in the nipple  Finding 3: Oval parallel anechoic simple cyst with circumscribed margins in the left breast at 5:00 5 cm the nipple.  Impression  Finding 1: Complex mixed cystic and solid lesion the left breast is suspicious ultrasound-guided biopsy is recommended  Finding 2: Mass in both breast are benign negative  Finding 3: Simple cyst in the left breast at 5:00 5 centers from the nipple is benign negative  BI-RADS 4B    2/6/2024 ultrasound-guided cyst aspiration  Patient's left breast was prepped and the cyst was visualized under ultrasound guidance.  A 21-gauge needle was inserted into the 6:00 cyst and 1/2 cc of yellow fluid extended in the syringe.  The walls collapsed.  There was complete resolution of the cyst.  The fluid was sent for cytology.  Cytology showed generated metaplastic cells, foamy histiocytes and neutrophils  Impression-cytology included degenerated metaplastic cells, foamy histiocytes, neutrophils, and cystic  debris's.  A 6-month follow-up ultrasound is recommended    7/9/2024 right diagnostic mammogram: Palpable lump or thickening in the right breast and post cyst aspiration in the left breast at 6:00.  The breast is extremely dense  Finding 1: Multiple oval masses of varying size obscured margins in the right breast  Finding 2: Oval mass with obscured margins in the right breast, correlates with the pinpointed palpable area at 1230 o'clock  Finding 3: Amorphous calcifications measuring 5 mm with grouped distribution in the posterior one third region of the right breast at 10:00.  Bilateral breast ultrasound  Finding 2: Cluster of large simple cyst measuring at least 53 x 25 x 55 mm seen in the right breast  No evidence of any solid masses or other abnormalities in the left breast  Impression:  Finding 1-multiple similar masses in the right breast are stable and benign  Finding 2-palpable masses in the right breast are benign, aspiration can be performed for symptom relief  Finding 3-calcifications in the right breast are suspicious stereotactic biopsy is recommended  BI-RADS 4A    7/22/2024 stereotactic biopsy  The patient's right breast at 10 o'clock position was isolated in the stereotactic unit and the calcifications were localized.  Using an inferior approach 9 core biopsy specimens were obtained using a 9 gauge vacuum-assisted device.  The specimens were radiographed confirming calcifications within the sample.  A Top-Hat shaped biopsy clip was deployed within the biopsy bed and the needle was withdrawn.  2 view mammogram shows clip migrated approximately 3 cm anterior in the ML view from expected biopsy site and the small to moderate size hematoma exists.  Impression  Technically successful right stereotactic biopsy, mircocalcifications with ADH and FEA. Pathology is concordant.    7/22/2024 right ultrasound-guided cyst aspiration  The right breast at 12:00 cluster of cyst were identified and using an 18-gauge  needle 45 mL of yellow fluid extended into the syringe.  The walls of the cyst collapsed there was complete sonographic resolution.  The fluid was discarded.  Patient tolerated the procedure well  Impression: Technically successful right ultrasound cyst aspiration as described    8/15/24 breast MRI  There is heterogeneous fibroglandular tissue. There is marked  background parenchymal enhancement, right greater than left, which limits the sensitivity for detection of invasive malignancy and DCIS.   There are numerous bilateral breast cysts, some of which demonstrate intrinsic T1 hyperintense signal consistent with complicated proteinaceous/hemorrhagic cysts. Additionally, there is intrinsic T1 hyperintense signal within multiple ducts in both breasts, right greater than left, consistent with proteinaceous and/or hemorrhagic contents.     RIGHT BREAST:    At 8-9 o'clock in the posterior right breast, 10 cm posterior to the  nipple, there is a 1.5 cm AP dimension, 1.7 cm transverse dimension, 1.5vcm craniocaudal dimension T1 hyperintense postbiopsy hematoma representing the site of biopsy-proven atypia. A focus of susceptibility from a biopsy clip (TOPHAT) is located at 9:00 in the posterior right breast, 9.4 cm posterior to the nipple, approximately 0.8 cm anterior and superior to the superior aspect of the postbiopsy hematoma. The clip was previously noted to be displaced from the biopsy site on the postbiopsy mammogram. No suspicious enhancing mass or area of non-mass enhancement is identified at the biopsy site or elsewhere within the right breast.  The visualized axilla is within normal limits.     LEFT BREAST:    No suspicious enhancing mass or area of non-mass enhancement is  identified.  The visualized axilla is within normal limits.     EXTRAMAMMARY FINDINGS:  There are no pathologically enlarged internal mammary chain lymph nodes on either side.    There are incompletely assessed T2 hyperintense hepatic  lesions.     IMPRESSION AND RECOMMENDATION:  1.  A 1.7 cm post biopsy hematoma at 8-9 o'clock in the posterior right  breast represents the site of biopsy-proven atypia. The biopsy clip is  located approximately 0.8 cm from the periphery of the hematoma and was noted to be displaced on postbiopsy mammogram. Recommend surgical management with preoperative localization targeting the biopsy site.  2.  No MRI evidence of malignancy in the left breast.  BI-RADS Category 4:    Pathology:  7/22/24  1.  Breast, right, 10:00, for calcifications, biopsy (Top-Hat clip): Breast tissue with                -A.  Atypical ductal hyperplasia               -B.  Flat epithelial atypia               -C.  Microcalcifications associated with the atypical ductal hyperplasia, flat epithelial atypia and nonneoplastic tissue               -D.  Clustered cysts               -E.  Sclerosing adenosis               -F.  Fibroadenomatoid hyperplasia      8/28/24 Procedure  Diagnostic Ultrasound Breast, Targeted    Procedure: Diagnostic ultrasound,  Right breast  Indication: New enlarging lump in superior right breast   Comparisons: MRI 8/15/24,   Description of procedure: Using a 10MHz linear transducer, I scanned the breast at the area of interest.  Findings:  At 1:00, 4 cm FN, there is a hypoechoic, homogeneous mostly round but lobulated at the inferior edge and well circumscribed margins cystic mass with posterior enhancement, measuring 3.6 x 3.4 cm in antiradial dimension and 3.4 x 5.2 cm in radial dimension. The lesion is oriented oblique and is in a similar region to the previous area of aspiration. It is compressible.       Impression:   Probably benign, 5.2 cm cystic lesion in the rightbreast at 1:00, 4 cm from the nipple. This is amenable to ultrasound-guided aspiration. Correlation with exam/MMG. BI-RADS 3.       Procedure: Ultrasound-guided needle aspiration of right breast cyst  Indication: Enlarging Breast cyst  Location: right  breast, 1:00, 4 cm FN  Consent: The risks, benefits, and alternatives to the procedure were discussed with the patient, who understood and wished to proceed. The risks described included, but were not limited to, bleeding, infection, incomplete drainage and recurrence requiring repeat aspiration.  Description of Procedure: After the patient was positioned supine on the procedure table, I located the fluid collection using ultrasound as described above. The area was prepped with alcohol and draped in sterile fashion. I anesthetized the breast skin with 1% lidocaine with epinephrine. I then anesthetized the underlying subcutaneous tissue and breast parenchyma surrounding the lesion with 1% lidocaine with epinephrine under ultrasound visualization and guidance. I then inserted a 18 G needle (attached to a syringe) from a inferolateral to superomedial approach into the fluid collection under ultrasound guidance and aspirated fluid. Approximately 23 mL of thin brown fluid was aspirated and sent for cytology. The collection significantly decreased in size, however did not completely disappear.   Marker placed: none  Tolerance: The patient tolerated the procedure well.   Disposition: will call with cytology results. MRI with signs of hemorraghic cysts throughout both breasts but more in the right than left. Likely benign. Incomplete aspiration to allow for repeat identification if suspicion on cytology.            Assessment & Plan   Assessment:  48 y.o. F with a new diagnosis of right breast ADH    Discussion:  We discussed the clinical significance of ADH in terms of the potential for identifying associated malignancy at time of excision as well as its being a risk marker for future breast cancer in either breast.     We discussed management options in terms of ADH being a potential precursor, high risk benign lesion including excisional biopsy to evaluate for associated malignancy as well as surveillance. The excisional  biopsy procedure was explained. Potential risks of surgery including bleeding, infection, hematoma and scar were reviewed and we also discussed the potential need to return to the operating room in the event of close margins or malignancy were identified in the surgical specimen in which case a definitive cancer operation would be recommended.       We also reviewed that atypical ductal hyperplasia is considered a risk factor for future development of a breast cancer in either breast, with the associated risk being approximately 2-4-fold general population risk. We discussed chemoprevention as an effective means of risk reduction, and medical oncology consultation to discuss chemoprevention was recommended. We also discussed recommendation for high risk breast surveillance going forward, with goal of early detection should she develop a breast cancer. This would include annual mammography as well as annual breast mri, alternating every 6 months and combined with breast exams.            Plan:    - OR analilia guided guided partial mastectomy for ADH  - follow up cytology results from aspiration. History of multiple cyst aspirations, MRI and Mammogram/US's with multiple cysts.   - Next Mammogram will be due - 1/27/25 ([facility]) and MRIs will be due in August 2025  - defer referral to medical oncology for discussion of risk reduction until after pathology from surgery available  -Consents completed and signed. Discussed the risks and benefits of right breast partial mastectomy, analilia guided at length including estimated time for procedure, postoperative recovery, and postoperative instructions. Discussed the risks to include pain, bleeding, infection, nerve injury, numbness, seroma, skin complications including necrosis, breast asymmetry, need for re-excision, and scar.  Patient appears to understand and wishes to proceed.  All questions were answered.  - liver cysts to be follow up by PCP/OBGYNs office          Coleen Nunn  MD  Breast Surgical Oncology    I spent 45 minutes caring for Geri on this date of service. This time includes time spent by me in the following activities: preparing for the visit, reviewing tests, obtaining and/or reviewing a separately obtained history, performing a medically appropriate examination and/or evaluation , counseling and educating the patient/family/caregiver, ordering medications, tests, or procedures, referring and communicating with other health care professionals , documenting information in the medical record, independently interpreting results and communicating that information with the patient/family/caregiver, and care coordination.  I spent 10 minutes on the separately reported service of this clinic visit for completion of the diagnostic ultrasound and aspiration. This time is not included in the time used to support the E/M service also reported today.        CC:  MD Gisele Ovalle, acting PCP for patient

## 2024-08-29 ENCOUNTER — PREP FOR SURGERY (OUTPATIENT)
Dept: OTHER | Facility: HOSPITAL | Age: 49
End: 2024-08-29

## 2024-08-29 DIAGNOSIS — N60.91 ATYPICAL DUCTAL HYPERPLASIA OF RIGHT BREAST: Primary | ICD-10-CM

## 2024-08-29 PROBLEM — N60.19 CYSTIC BREAST, UNSPECIFIED LATERALITY: Status: ACTIVE | Noted: 2024-08-29

## 2024-08-29 PROBLEM — N60.01 SOLITARY CYST OF RIGHT BREAST: Status: ACTIVE | Noted: 2024-08-29

## 2024-08-30 ENCOUNTER — PATIENT ROUNDING (BHMG ONLY) (OUTPATIENT)
Dept: SURGERY | Facility: CLINIC | Age: 49
End: 2024-08-30
Payer: COMMERCIAL

## 2024-08-30 LAB
CYTO UR: NORMAL
LAB AP CASE REPORT: NORMAL
LAB AP CLINICAL INFORMATION: NORMAL
PATH REPORT.FINAL DX SPEC: NORMAL
PATH REPORT.GROSS SPEC: NORMAL

## 2024-09-03 ENCOUNTER — TELEPHONE (OUTPATIENT)
Dept: SURGERY | Facility: CLINIC | Age: 49
End: 2024-09-03
Payer: COMMERCIAL

## 2024-09-13 ENCOUNTER — PRE-ADMISSION TESTING (OUTPATIENT)
Dept: PREADMISSION TESTING | Facility: HOSPITAL | Age: 49
End: 2024-09-13
Payer: COMMERCIAL

## 2024-09-13 ENCOUNTER — APPOINTMENT (OUTPATIENT)
Dept: WOMENS IMAGING | Facility: HOSPITAL | Age: 49
End: 2024-09-13
Payer: COMMERCIAL

## 2024-09-13 VITALS
WEIGHT: 145 LBS | DIASTOLIC BLOOD PRESSURE: 75 MMHG | SYSTOLIC BLOOD PRESSURE: 124 MMHG | HEART RATE: 65 BPM | RESPIRATION RATE: 16 BRPM | HEIGHT: 68 IN | TEMPERATURE: 98.2 F | OXYGEN SATURATION: 99 % | BODY MASS INDEX: 21.98 KG/M2

## 2024-09-13 DIAGNOSIS — N60.91 ATYPICAL DUCTAL HYPERPLASIA OF RIGHT BREAST: ICD-10-CM

## 2024-09-13 LAB
ANION GAP SERPL CALCULATED.3IONS-SCNC: 9 MMOL/L (ref 5–15)
BASOPHILS # BLD AUTO: 0.01 10*3/MM3 (ref 0–0.2)
BASOPHILS NFR BLD AUTO: 0.1 % (ref 0–1.5)
BUN SERPL-MCNC: 16 MG/DL (ref 6–20)
BUN/CREAT SERPL: 21.9 (ref 7–25)
CALCIUM SPEC-SCNC: 9.9 MG/DL (ref 8.6–10.5)
CHLORIDE SERPL-SCNC: 100 MMOL/L (ref 98–107)
CO2 SERPL-SCNC: 27 MMOL/L (ref 22–29)
CREAT SERPL-MCNC: 0.73 MG/DL (ref 0.57–1)
DEPRECATED RDW RBC AUTO: 42 FL (ref 37–54)
EGFRCR SERPLBLD CKD-EPI 2021: 101 ML/MIN/1.73
EOSINOPHIL # BLD AUTO: 0 10*3/MM3 (ref 0–0.4)
EOSINOPHIL NFR BLD AUTO: 0 % (ref 0.3–6.2)
ERYTHROCYTE [DISTWIDTH] IN BLOOD BY AUTOMATED COUNT: 11.8 % (ref 12.3–15.4)
GLUCOSE SERPL-MCNC: 129 MG/DL (ref 65–99)
HCT VFR BLD AUTO: 39.4 % (ref 34–46.6)
HGB BLD-MCNC: 13.1 G/DL (ref 12–15.9)
IMM GRANULOCYTES # BLD AUTO: 0.03 10*3/MM3 (ref 0–0.05)
IMM GRANULOCYTES NFR BLD AUTO: 0.4 % (ref 0–0.5)
LYMPHOCYTES # BLD AUTO: 1.13 10*3/MM3 (ref 0.7–3.1)
LYMPHOCYTES NFR BLD AUTO: 14.9 % (ref 19.6–45.3)
MCH RBC QN AUTO: 32.3 PG (ref 26.6–33)
MCHC RBC AUTO-ENTMCNC: 33.2 G/DL (ref 31.5–35.7)
MCV RBC AUTO: 97.3 FL (ref 79–97)
MONOCYTES # BLD AUTO: 0.36 10*3/MM3 (ref 0.1–0.9)
MONOCYTES NFR BLD AUTO: 4.8 % (ref 5–12)
NEUTROPHILS NFR BLD AUTO: 6.04 10*3/MM3 (ref 1.7–7)
NEUTROPHILS NFR BLD AUTO: 79.8 % (ref 42.7–76)
NRBC BLD AUTO-RTO: 0 /100 WBC (ref 0–0.2)
PLATELET # BLD AUTO: 292 10*3/MM3 (ref 140–450)
PMV BLD AUTO: 9.6 FL (ref 6–12)
POTASSIUM SERPL-SCNC: 4.2 MMOL/L (ref 3.5–5.2)
RBC # BLD AUTO: 4.05 10*6/MM3 (ref 3.77–5.28)
SODIUM SERPL-SCNC: 136 MMOL/L (ref 136–145)
WBC NRBC COR # BLD AUTO: 7.57 10*3/MM3 (ref 3.4–10.8)

## 2024-09-13 PROCEDURE — 80048 BASIC METABOLIC PNL TOTAL CA: CPT

## 2024-09-13 PROCEDURE — 85025 COMPLETE CBC W/AUTO DIFF WBC: CPT

## 2024-09-13 PROCEDURE — A4648 IMPLANTABLE TISSUE MARKER: HCPCS | Performed by: RADIOLOGY

## 2024-09-13 PROCEDURE — 36415 COLL VENOUS BLD VENIPUNCTURE: CPT

## 2024-09-13 PROCEDURE — 19285 PERQ DEV BREAST 1ST US IMAG: CPT | Performed by: RADIOLOGY

## 2024-09-13 RX ORDER — DOXYCYCLINE HYCLATE 50 MG/1
50 CAPSULE ORAL AS NEEDED
COMMUNITY

## 2024-09-13 NOTE — DISCHARGE INSTRUCTIONS
Take the following medications the morning of surgery:  NONE      If you are on prescription narcotic pain medication to control your pain you may also take that medication the morning of surgery.      General Instructions:     Do not eat solid food after midnight the night before surgery.  Clear liquids day of surgery are allowed but must be stopped at least two hours before your hospital arrival time.       Allowed clear liquids      Water, sodas, and tea or coffee with no cream or milk added.       12 to 20 ounces of a clear liquid that contains carbohydrates is recommended.  If non-diabetic, have Gatorade or Powerade.  If diabetic, have G2 or Powerade Zero.     Do not have liquids red in color.  Do not consume chicken, beef, pork or vegetable broth or bouillon cubes of any variety as they are not considered clear liquids and are not allowed.      Infants may have breast milk up to four hours before surgery.  Infants drinking formula may drink formula up to six hours before surgery.   Patients who avoid smoking, chewing tobacco and alcohol for 4 weeks prior to surgery have a reduced risk of post-operative complications.  Quit smoking as many days before surgery as you can.  Do not smoke, use chewing tobacco or drink alcohol the day of surgery.   If applicable bring your C-PAP/ BI-PAP machine in with you to preop day of surgery.  Bring any papers given to you in the doctor’s office.  Wear clean comfortable clothes.  Do not wear contact lenses, false eyelashes or make-up.  Bring a case for your glasses.   Bring crutches or walker if applicable.  Remove all piercings.  Leave jewelry and any other valuables at home.  Hair extensions with metal clips must be removed prior to surgery.  The Pre-Admission Testing nurse will instruct you to bring medications if unable to obtain an accurate list in Pre-Admission Testing.          Preventing a Surgical Site Infection:  For 2 to 3 days before surgery, avoid shaving with a  razor because the razor can irritate skin and make it easier to develop an infection.    Any areas of open skin can increase the risk of a post-operative wound infection by allowing bacteria to enter and travel throughout the body.  Notify your surgeon if you have any skin wounds / rashes even if it is not near the expected surgical site.  The area will need assessed to determine if surgery should be delayed until it is healed.  The night prior to surgery shower using a fresh bar of anti-bacterial soap (such as Dial) and clean washcloth.  Sleep in a clean bed with clean clothing.  Do not allow pets to sleep with you.  Shower on the morning of surgery using a fresh bar of anti-bacterial soap (such as Dial) and clean washcloth.  Dry with a clean towel and dress in clean clothing.  Ask your surgeon if you will be receiving antibiotics prior to surgery.  Make sure you, your family, and all healthcare providers clean their hands with soap and water or an alcohol based hand  before caring for you or your wound.    Day of surgery:  Your arrival time is approximately two hours before your scheduled surgery time.  Please note if you have an early arrival time the surgery doors do not open before 5:00 AM.  Upon arrival, a Pre-op nurse and Anesthesiologist will review your health history, obtain vital signs, and answer questions you may have.  The only belongings needed at this time will be a list of your home medications and if applicable your C-PAP/BI-PAP machine.  A Pre-op nurse will start an IV and you may receive medication in preparation for surgery, including something to help you relax.     Please be aware that surgery does come with discomfort.  We want to make every effort to control your discomfort so please discuss any uncontrolled symptoms with your nurse.   Your doctor will most likely have prescribed pain medications.      If you are going home after surgery you will receive individualized written care  instructions before being discharged.  A responsible adult must drive you to and from the hospital on the day of your surgery and ideally stay with you through the night.   .  Discharge prescriptions can be filled by the hospital pharmacy during regular pharmacy hours.  If you are having surgery late in the day/evening your prescription may be e-prescribed to your pharmacy.  Please verify your pharmacy hours or chose a 24 hour pharmacy to avoid not having access to your prescription because your pharmacy has closed for the day.    If you are staying overnight following surgery, you will be transported to your hospital room following the recovery period.  [facility] has all private rooms.    If you have any questions please call Pre-Admission Testing at [phone].  Deductibles and co-payments are collected on the day of service. Please be prepared to pay the required co-pay, deductible or deposit on the day of service as defined by your plan.    Call your surgeon immediately if you experience any of the following symptoms:  Sore Throat  Shortness of Breath or difficulty breathing  Cough  Chills  Body soreness or muscle pain  Headache  Fever  New loss of taste or smell  Do not arrive for your surgery ill.  Your procedure will need to be rescheduled to another time.  You will need to call your physician before the day of surgery to avoid any unnecessary exposure to hospital staff as well as other patients.

## 2024-09-24 ENCOUNTER — TRANSCRIBE ORDERS (OUTPATIENT)
Dept: LAB | Facility: HOSPITAL | Age: 49
End: 2024-09-24
Payer: COMMERCIAL

## 2024-09-24 ENCOUNTER — ANCILLARY PROCEDURE (OUTPATIENT)
Dept: LAB | Facility: HOSPITAL | Age: 49
End: 2024-09-24
Payer: COMMERCIAL

## 2024-09-24 ENCOUNTER — ANESTHESIA (OUTPATIENT)
Dept: PERIOP | Facility: HOSPITAL | Age: 49
End: 2024-09-24
Payer: COMMERCIAL

## 2024-09-24 ENCOUNTER — ANESTHESIA EVENT (OUTPATIENT)
Dept: PERIOP | Facility: HOSPITAL | Age: 49
End: 2024-09-24
Payer: COMMERCIAL

## 2024-09-24 ENCOUNTER — APPOINTMENT (OUTPATIENT)
Dept: GENERAL RADIOLOGY | Facility: HOSPITAL | Age: 49
End: 2024-09-24
Payer: COMMERCIAL

## 2024-09-24 ENCOUNTER — HOSPITAL ENCOUNTER (OUTPATIENT)
Facility: HOSPITAL | Age: 49
Setting detail: HOSPITAL OUTPATIENT SURGERY
Discharge: HOME | End: 2024-09-24
Attending: STUDENT IN AN ORGANIZED HEALTH CARE EDUCATION/TRAINING PROGRAM | Admitting: STUDENT IN AN ORGANIZED HEALTH CARE EDUCATION/TRAINING PROGRAM
Payer: COMMERCIAL

## 2024-09-24 VITALS
HEART RATE: 65 BPM | WEIGHT: 145 LBS | TEMPERATURE: 97.7 F | SYSTOLIC BLOOD PRESSURE: 119 MMHG | HEIGHT: 68 IN | OXYGEN SATURATION: 100 % | BODY MASS INDEX: 21.98 KG/M2 | DIASTOLIC BLOOD PRESSURE: 61 MMHG | RESPIRATION RATE: 16 BRPM

## 2024-09-24 DIAGNOSIS — N60.91 ATYPICAL DUCTAL HYPERPLASIA OF RIGHT BREAST: Primary | ICD-10-CM

## 2024-09-24 DIAGNOSIS — N60.91 ATYPICAL DUCTAL HYPERPLASIA OF RIGHT BREAST: ICD-10-CM

## 2024-09-24 LAB
B-HCG UR QL: NEGATIVE
EXPIRATION DATE: NORMAL
INTERNAL NEGATIVE CONTROL: NEGATIVE
INTERNAL POSITIVE CONTROL: POSITIVE
Lab: NORMAL

## 2024-09-24 PROCEDURE — 25010000002 PROPOFOL 1000 MG/100ML EMULSION: Performed by: NURSE ANESTHETIST, CERTIFIED REGISTERED

## 2024-09-24 PROCEDURE — 76098 X-RAY EXAM SURGICAL SPECIMEN: CPT

## 2024-09-24 PROCEDURE — 19301 PARTIAL MASTECTOMY: CPT | Performed by: STUDENT IN AN ORGANIZED HEALTH CARE EDUCATION/TRAINING PROGRAM

## 2024-09-24 PROCEDURE — 88307 TISSUE EXAM BY PATHOLOGIST: CPT | Performed by: STUDENT IN AN ORGANIZED HEALTH CARE EDUCATION/TRAINING PROGRAM

## 2024-09-24 PROCEDURE — 81025 URINE PREGNANCY TEST: CPT | Performed by: STUDENT IN AN ORGANIZED HEALTH CARE EDUCATION/TRAINING PROGRAM

## 2024-09-24 PROCEDURE — 25810000003 LACTATED RINGERS PER 1000 ML: Performed by: STUDENT IN AN ORGANIZED HEALTH CARE EDUCATION/TRAINING PROGRAM

## 2024-09-24 PROCEDURE — 25010000002 CEFAZOLIN PER 500 MG: Performed by: STUDENT IN AN ORGANIZED HEALTH CARE EDUCATION/TRAINING PROGRAM

## 2024-09-24 PROCEDURE — 25010000002 PROPOFOL 200 MG/20ML EMULSION: Performed by: NURSE ANESTHETIST, CERTIFIED REGISTERED

## 2024-09-24 PROCEDURE — 25010000002 MIDAZOLAM PER 1 MG: Performed by: STUDENT IN AN ORGANIZED HEALTH CARE EDUCATION/TRAINING PROGRAM

## 2024-09-24 PROCEDURE — 25010000002 BUPIVACAINE (PF) 0.5 % SOLUTION 10 ML VIAL: Performed by: STUDENT IN AN ORGANIZED HEALTH CARE EDUCATION/TRAINING PROGRAM

## 2024-09-24 PROCEDURE — 25010000002 FENTANYL CITRATE (PF) 50 MCG/ML SOLUTION: Performed by: NURSE ANESTHETIST, CERTIFIED REGISTERED

## 2024-09-24 RX ORDER — OXYCODONE AND ACETAMINOPHEN 7.5; 325 MG/1; MG/1
1 TABLET ORAL EVERY 4 HOURS PRN
Status: DISCONTINUED | OUTPATIENT
Start: 2024-09-24 | End: 2024-09-24 | Stop reason: HOSPADM

## 2024-09-24 RX ORDER — SODIUM CHLORIDE 0.9 % (FLUSH) 0.9 %
3 SYRINGE (ML) INJECTION EVERY 12 HOURS SCHEDULED
Status: DISCONTINUED | OUTPATIENT
Start: 2024-09-24 | End: 2024-09-24 | Stop reason: HOSPADM

## 2024-09-24 RX ORDER — SODIUM CHLORIDE, SODIUM LACTATE, POTASSIUM CHLORIDE, CALCIUM CHLORIDE 600; 310; 30; 20 MG/100ML; MG/100ML; MG/100ML; MG/100ML
9 INJECTION, SOLUTION INTRAVENOUS CONTINUOUS
Status: DISCONTINUED | OUTPATIENT
Start: 2024-09-24 | End: 2024-09-24 | Stop reason: HOSPADM

## 2024-09-24 RX ORDER — HYDROCODONE BITARTRATE AND ACETAMINOPHEN 5; 325 MG/1; MG/1
1 TABLET ORAL ONCE AS NEEDED
Status: DISCONTINUED | OUTPATIENT
Start: 2024-09-24 | End: 2024-09-24 | Stop reason: HOSPADM

## 2024-09-24 RX ORDER — NALOXONE HCL 0.4 MG/ML
0.2 VIAL (ML) INJECTION AS NEEDED
Status: DISCONTINUED | OUTPATIENT
Start: 2024-09-24 | End: 2024-09-24 | Stop reason: HOSPADM

## 2024-09-24 RX ORDER — SODIUM CHLORIDE 0.9 % (FLUSH) 0.9 %
3-10 SYRINGE (ML) INJECTION AS NEEDED
Status: DISCONTINUED | OUTPATIENT
Start: 2024-09-24 | End: 2024-09-24 | Stop reason: HOSPADM

## 2024-09-24 RX ORDER — HYDROMORPHONE HYDROCHLORIDE 1 MG/ML
0.5 INJECTION, SOLUTION INTRAMUSCULAR; INTRAVENOUS; SUBCUTANEOUS
Status: DISCONTINUED | OUTPATIENT
Start: 2024-09-24 | End: 2024-09-24 | Stop reason: HOSPADM

## 2024-09-24 RX ORDER — LIDOCAINE HYDROCHLORIDE 10 MG/ML
0.5 INJECTION, SOLUTION INFILTRATION; PERINEURAL ONCE AS NEEDED
Status: DISCONTINUED | OUTPATIENT
Start: 2024-09-24 | End: 2024-09-24 | Stop reason: HOSPADM

## 2024-09-24 RX ORDER — FLUMAZENIL 0.1 MG/ML
0.2 INJECTION INTRAVENOUS AS NEEDED
Status: DISCONTINUED | OUTPATIENT
Start: 2024-09-24 | End: 2024-09-24 | Stop reason: HOSPADM

## 2024-09-24 RX ORDER — IPRATROPIUM BROMIDE AND ALBUTEROL SULFATE 2.5; .5 MG/3ML; MG/3ML
3 SOLUTION RESPIRATORY (INHALATION) ONCE AS NEEDED
Status: DISCONTINUED | OUTPATIENT
Start: 2024-09-24 | End: 2024-09-24 | Stop reason: HOSPADM

## 2024-09-24 RX ORDER — MIDAZOLAM HYDROCHLORIDE 1 MG/ML
2 INJECTION INTRAMUSCULAR; INTRAVENOUS
Status: DISCONTINUED | OUTPATIENT
Start: 2024-09-24 | End: 2024-09-24 | Stop reason: HOSPADM

## 2024-09-24 RX ORDER — DROPERIDOL 2.5 MG/ML
0.62 INJECTION, SOLUTION INTRAMUSCULAR; INTRAVENOUS
Status: DISCONTINUED | OUTPATIENT
Start: 2024-09-24 | End: 2024-09-24 | Stop reason: HOSPADM

## 2024-09-24 RX ORDER — PROPOFOL 10 MG/ML
INJECTION, EMULSION INTRAVENOUS AS NEEDED
Status: DISCONTINUED | OUTPATIENT
Start: 2024-09-24 | End: 2024-09-24 | Stop reason: SURG

## 2024-09-24 RX ORDER — PROPOFOL 10 MG/ML
INJECTION, EMULSION INTRAVENOUS CONTINUOUS PRN
Status: DISCONTINUED | OUTPATIENT
Start: 2024-09-24 | End: 2024-09-24 | Stop reason: SURG

## 2024-09-24 RX ORDER — PROMETHAZINE HYDROCHLORIDE 25 MG/1
25 TABLET ORAL ONCE AS NEEDED
Status: DISCONTINUED | OUTPATIENT
Start: 2024-09-24 | End: 2024-09-24 | Stop reason: HOSPADM

## 2024-09-24 RX ORDER — FENTANYL CITRATE 50 UG/ML
INJECTION, SOLUTION INTRAMUSCULAR; INTRAVENOUS AS NEEDED
Status: DISCONTINUED | OUTPATIENT
Start: 2024-09-24 | End: 2024-09-24 | Stop reason: SURG

## 2024-09-24 RX ORDER — ONDANSETRON 2 MG/ML
4 INJECTION INTRAMUSCULAR; INTRAVENOUS ONCE AS NEEDED
Status: DISCONTINUED | OUTPATIENT
Start: 2024-09-24 | End: 2024-09-24 | Stop reason: HOSPADM

## 2024-09-24 RX ORDER — PROMETHAZINE HYDROCHLORIDE 25 MG/1
25 SUPPOSITORY RECTAL ONCE AS NEEDED
Status: DISCONTINUED | OUTPATIENT
Start: 2024-09-24 | End: 2024-09-24 | Stop reason: HOSPADM

## 2024-09-24 RX ORDER — EPHEDRINE SULFATE 50 MG/ML
INJECTION INTRAVENOUS AS NEEDED
Status: DISCONTINUED | OUTPATIENT
Start: 2024-09-24 | End: 2024-09-24 | Stop reason: SURG

## 2024-09-24 RX ORDER — DIPHENHYDRAMINE HYDROCHLORIDE 50 MG/ML
12.5 INJECTION INTRAMUSCULAR; INTRAVENOUS
Status: DISCONTINUED | OUTPATIENT
Start: 2024-09-24 | End: 2024-09-24 | Stop reason: HOSPADM

## 2024-09-24 RX ORDER — HYDRALAZINE HYDROCHLORIDE 20 MG/ML
5 INJECTION INTRAMUSCULAR; INTRAVENOUS
Status: DISCONTINUED | OUTPATIENT
Start: 2024-09-24 | End: 2024-09-24 | Stop reason: HOSPADM

## 2024-09-24 RX ORDER — FENTANYL CITRATE 50 UG/ML
50 INJECTION, SOLUTION INTRAMUSCULAR; INTRAVENOUS
Status: DISCONTINUED | OUTPATIENT
Start: 2024-09-24 | End: 2024-09-24 | Stop reason: HOSPADM

## 2024-09-24 RX ORDER — LABETALOL HYDROCHLORIDE 5 MG/ML
5 INJECTION, SOLUTION INTRAVENOUS
Status: DISCONTINUED | OUTPATIENT
Start: 2024-09-24 | End: 2024-09-24 | Stop reason: HOSPADM

## 2024-09-24 RX ORDER — LIDOCAINE HYDROCHLORIDE 20 MG/ML
INJECTION, SOLUTION INFILTRATION; PERINEURAL AS NEEDED
Status: DISCONTINUED | OUTPATIENT
Start: 2024-09-24 | End: 2024-09-24 | Stop reason: SURG

## 2024-09-24 RX ORDER — EPHEDRINE SULFATE 50 MG/ML
5 INJECTION, SOLUTION INTRAVENOUS ONCE AS NEEDED
Status: DISCONTINUED | OUTPATIENT
Start: 2024-09-24 | End: 2024-09-24 | Stop reason: HOSPADM

## 2024-09-24 RX ADMIN — MIDAZOLAM 2 MG: 1 INJECTION INTRAMUSCULAR; INTRAVENOUS at 08:26

## 2024-09-24 RX ADMIN — LIDOCAINE HYDROCHLORIDE 60 MG: 20 INJECTION, SOLUTION INFILTRATION; PERINEURAL at 09:23

## 2024-09-24 RX ADMIN — FENTANYL CITRATE 50 MCG: 50 INJECTION, SOLUTION INTRAMUSCULAR; INTRAVENOUS at 09:20

## 2024-09-24 RX ADMIN — PROPOFOL 140 MCG/KG/MIN: 10 INJECTION, EMULSION INTRAVENOUS at 09:23

## 2024-09-24 RX ADMIN — EPHEDRINE SULFATE 10 MG: 50 INJECTION INTRAVENOUS at 09:40

## 2024-09-24 RX ADMIN — PROPOFOL INJECTABLE EMULSION 30 MG: 10 INJECTION, EMULSION INTRAVENOUS at 09:23

## 2024-09-24 RX ADMIN — SODIUM CHLORIDE, POTASSIUM CHLORIDE, SODIUM LACTATE AND CALCIUM CHLORIDE 9 ML/HR: 600; 310; 30; 20 INJECTION, SOLUTION INTRAVENOUS at 08:26

## 2024-09-24 RX ADMIN — SODIUM CHLORIDE 2000 MG: 900 INJECTION INTRAVENOUS at 09:10

## 2024-09-25 DIAGNOSIS — N60.01 SOLITARY CYST OF RIGHT BREAST: Primary | ICD-10-CM

## 2024-09-26 ENCOUNTER — TELEPHONE (OUTPATIENT)
Dept: SURGERY | Facility: CLINIC | Age: 49
End: 2024-09-26
Payer: COMMERCIAL

## 2024-09-26 LAB
CYTO UR: NORMAL
LAB AP CASE REPORT: NORMAL
LAB AP DIAGNOSIS COMMENT: NORMAL
PATH REPORT.FINAL DX SPEC: NORMAL
PATH REPORT.GROSS SPEC: NORMAL

## 2024-10-10 ENCOUNTER — OFFICE VISIT (OUTPATIENT)
Dept: SURGERY | Facility: CLINIC | Age: 49
End: 2024-10-10
Payer: COMMERCIAL

## 2024-10-10 VITALS
HEIGHT: 68 IN | DIASTOLIC BLOOD PRESSURE: 76 MMHG | SYSTOLIC BLOOD PRESSURE: 128 MMHG | RESPIRATION RATE: 18 BRPM | WEIGHT: 145 LBS | HEART RATE: 66 BPM | BODY MASS INDEX: 21.98 KG/M2 | OXYGEN SATURATION: 98 %

## 2024-10-10 DIAGNOSIS — N60.91 ATYPICAL DUCTAL HYPERPLASIA OF RIGHT BREAST: Primary | ICD-10-CM

## 2024-10-10 DIAGNOSIS — Z12.31 VISIT FOR SCREENING MAMMOGRAM: ICD-10-CM

## 2024-10-10 PROCEDURE — 99024 POSTOP FOLLOW-UP VISIT: CPT | Performed by: STUDENT IN AN ORGANIZED HEALTH CARE EDUCATION/TRAINING PROGRAM

## 2024-10-10 NOTE — PROGRESS NOTES
Breast Surgical Oncology Post-Op Visit    Surgery: Right breast excisional biopsy  Subjective: No acute issues. Denies f/c/wnd issues. Intermittent use of pain medication.     O:  Vitals:    10/10/24 0814   BP: 128/76   Pulse: 66   Resp: 18   SpO2: 98%     Breast incision:  healing well without evidence of infection or significant seroma  Axillary Incision: healing well without evidence of infection or significant seroma    Pathology:   9/24/24  1.   Right breast, excisional biopsy:          A.  Negative for residual atypical ductal hyperplasia or flat epithelial atypia.          B.  Benign breast parenchyma with columnar cell change and associated microcalcifications.         C.  Biopsy site changes present.     2.  Right breast,  additional superior margin (inked and oriented), excision:         A.  Negative for residual atypical ductal hyperplasia or flat epithelial atypia.         B.  Benign breast parenchyma with focal columnar cell change and microcalcifications               associated with benign lobular units.         C.  Clip and biopsy site changes present.    Assessment: s/p right breast excisional biopsy, initially diagnosed with ADH, no residual atypia or more concerning lesion at the time of surgery, recovering well.  Discussed pathology at length as well as planned/possible adjuvant treatments.  All questions answered.    Plan:   - RTC 6 months for clinical breast exam with Timothy Andrews for High Risk screening/followup  - Next Mammogram will be due - 1/27/25 ([facility]) and MRIs will be due in August 2025   - Med Onc referral discussed chemoprevention          Coleen Nunn MD  Breast Surgical Oncology

## 2024-10-10 NOTE — LETTER
October 10, 2024     Gisele Britton MD  [address] [facility]  [address]  [facility] [address]    Patient: Geri Romeo   YOB: 1975   Date of Visit: 10/10/2024     Dear Gisele Britton MD:       Thank you for referring Geri Romeo to me for evaluation. Below are the relevant portions of my assessment and plan of care.    If you have questions, please do not hesitate to call me. I look forward to following Geri along with you.         Sincerely,        Coleen Nunn MD        CC: No Recipients    Coleen Nunn MD  10/10/24 1253  Sign when Signing Visit  Breast Surgical Oncology Post-Op Visit    Surgery: Right breast excisional biopsy  Subjective: No acute issues. Denies f/c/wnd issues. Intermittent use of pain medication.     O:  Vitals:    10/10/24 0814   BP: 128/76   Pulse: 66   Resp: 18   SpO2: 98%     Breast incision:  healing well without evidence of infection or significant seroma  Axillary Incision: healing well without evidence of infection or significant seroma    Pathology:   9/24/24  1.   Right breast, excisional biopsy:          A.  Negative for residual atypical ductal hyperplasia or flat epithelial atypia.          B.  Benign breast parenchyma with columnar cell change and associated microcalcifications.         C.  Biopsy site changes present.     2.  Right breast,  additional superior margin (inked and oriented), excision:         A.  Negative for residual atypical ductal hyperplasia or flat epithelial atypia.         B.  Benign breast parenchyma with focal columnar cell change and microcalcifications               associated with benign lobular units.         C.  Clip and biopsy site changes present.    Assessment: s/p right breast excisional biopsy, initially diagnosed with ADH, no residual atypia or more concerning lesion at the time of surgery, recovering well.  Discussed pathology at length as well as planned/possible adjuvant treatments.  All questions answered.    Plan:   - RTC 6  months for clinical breast exam with Timothy Andrews for High Risk screening/followup  - Next Mammogram will be due - 1/27/25 ([facility]) and MRIs will be due in August 2025   - Med Onc referral discussed chemoprevention          Coleen Nunn MD  Breast Surgical Oncology

## 2024-10-16 ENCOUNTER — TELEPHONE (OUTPATIENT)
Dept: SURGERY | Facility: CLINIC | Age: 49
End: 2024-10-16
Payer: COMMERCIAL

## 2024-10-16 NOTE — TELEPHONE ENCOUNTER
Patient called yesterday asking for a referral to be put in for [facility] to drain her seroma. I informed  of this, tried to call pt today left vm  is wanting to know if this drainage is from her surgery site. If so she would like her to come in to see us to do the aspiration.     [place]

## 2024-11-06 NOTE — PROGRESS NOTES
Appointment    REASON FOR CONSULTATION:   ADH, right breast                             REQUESTING PHYSICIAN: Coleen Nunn MD  RECORDS OBTAINED:  Records of the patients history including those from the electronic medical record were reviewed and summarized in detail.    HISTORY OF PRESENT ILLNESS:  The patient is a 49 y.o. year old female recently diagnosed right breast atypical ductal hyperplasia, here to discuss chemoprevention.  Oncologic history outlined below    Oncology history:  1/26/2024 bilateral diagnostic mammogram-breast extremely dense.  Finding 1 isodense oval mass obscured margins in left breast 6:00 6 cm from nipple.  Finding 2 isodense oval masses with circumscribed margin in both breast.  Left breast ultrasound-finding 1 ultrasound shows oval partially complex mixed cystic and solid lesion with partially defined margins measuring 10 x 9 x 7 mm in left breast at 6:00 6 cm from nipple.  Finding 3 oval parallel anechoic simple cyst with circumscribed margin in left breast 5 cm from nipple.  BI-RADS 4B.  2/6/2024 ultrasound-guided cyst aspiration  7/9/2024 right diagnostic mammogram-palpable lump or thickening in right breast and post cyst aspiration in left breast at 6:00.  Breast extremely dense.  Finding 1 multiple oval masses of varying size obscuring margins in right breast.  Finding 2 oval mass with obscured margin in right breast correlates with pinpointed palpable area at 1230 o'clock.  Finding 3 amorphous calcifications measuring 5 mm with grouped distribution in posterior one third of right breast at 10:00.  Bilateral breast ultrasound.  Finding 1 multiple similar masses in right breast stable and benign.  Finding 2 palpable masses in right breast benign aspiration can be performed for symptom relief.  Pending 3 calcification right breast suspicious biopsy recommended.  BI-RADS 4A.  7/22/2024 right breast ultrasound-guided cyst aspiration-benign  7/22/2024 right breast 10:00 biopsy for  calcifications-atypical ductal hyperplasia  8/15/2024 MRI bilateral breast: 1.7 cm postbiopsy hematoma at 8-9 o'clock in posterior right breast representing site of biopsy-proven atypia.  Biopsy clip located 0.8 cm from periphery of hematoma noted to be displaced on postbiopsy mammogram.  Normal MRI of left breast  2024 right breast excisional biopsy-negative for residual atypical ductal hyperplasia or flat epithelial atypia.    Gyn history:  Age at first childbirth 26   Age at menarche: 12  Lactation/how lon years   Age at menopause: had ablation, 2 years ago at the age of 47 years for irregular/heavy periods  Total years of oral contraceptive use: 5 years  Total years of hormone replacement therapy: never    Past Medical History:   Diagnosis Date    Atypical ductal hyperplasia of right breast     PONV (postoperative nausea and vomiting)     Rosacea      Past Surgical History:   Procedure Laterality Date    APPENDECTOMY      BREAST LUMPECTOMY Right 2024    Procedure: right breast partial mastectomy, analilia guided;  Surgeon: Coleen Nunn MD;  Location: [facility];  Service: General;  Laterality: Right;    CYST REMOVAL      SCALP    ENDOMETRIAL ABLATION      LACERATION REPAIR Right 2024    FOOT    VAGINAL DELIVERY      x 2       MEDICATIONS    Current Outpatient Medications:     doxycycline (VIBRAMYCIN) 50 MG capsule, Take 1 capsule by mouth As Needed., Disp: , Rfl:     ALLERGIES:   No Known Allergies    SOCIAL HISTORY:       Social History     Socioeconomic History    Marital status: Unknown   Tobacco Use    Smoking status: Never    Smokeless tobacco: Never   Vaping Use    Vaping status: Never Used   Substance and Sexual Activity    Alcohol use: Yes     Comment: OCCASIONAL    Drug use: No    Sexual activity: Yes     Partners: Male     Birth control/protection: Implant     Comment:  has had a vascectomy         FAMILY HISTORY:  Family History   Problem Relation Age of Onset    Coronary  "artery disease Maternal Grandfather     Glaucoma Paternal Grandfather     Malig Hyperthermia Neg Hx        REVIEW OF SYSTEMS:  Review of Systems   Constitutional: Negative.    HENT: Negative.     Respiratory: Negative.     Cardiovascular: Negative.    Gastrointestinal: Negative.    Genitourinary: Negative.    Musculoskeletal: Negative.    Hematological: Negative.               Vitals:    11/07/24 1041   BP: 122/65   Pulse: 71   Resp: 16   Temp: 98.1 °F (36.7 °C)   TempSrc: Oral   SpO2: 99%   Weight: 66.3 kg (146 lb 3.2 oz)   Height: 172.7 cm (67.99\")   PainSc: 0-No pain         11/7/2024    10:33 AM   Current Status   ECOG score 0        PHYSICAL EXAM:    Physical Exam  Constitutional:       Appearance: Normal appearance.   HENT:      Head: Normocephalic and atraumatic.      Mouth/Throat:      Mouth: Mucous membranes are moist.      Pharynx: Oropharynx is clear.   Eyes:      Extraocular Movements: Extraocular movements intact.      Pupils: Pupils are equal, round, and reactive to light.   Cardiovascular:      Rate and Rhythm: Normal rate and regular rhythm.   Pulmonary:      Effort: Pulmonary effort is normal.      Breath sounds: Normal breath sounds.   Chest:          Comments: Nodular tissue noted  Abdominal:      General: Bowel sounds are normal.      Palpations: Abdomen is soft.   Musculoskeletal:      Cervical back: Normal range of motion and neck supple.   Neurological:      General: No focal deficit present.      Mental Status: She is alert and oriented to person, place, and time.   Psychiatric:         Mood and Affect: Mood normal.         Behavior: Behavior normal.           RECENT LABS:        WBC   Date Value Ref Range Status   11/07/2024 4.93 3.40 - 10.80 10*3/mm3 Final   09/13/2024 7.57 3.40 - 10.80 10*3/mm3 Final   04/10/2018 4.78 4.50 - 10.70 10*3/mm3 Final     Hemoglobin   Date Value Ref Range Status   11/07/2024 13.6 12.0 - 15.9 g/dL Final   09/13/2024 13.1 12.0 - 15.9 g/dL Final   04/10/2018 13.4 " 11.9 - 15.5 g/dL Final     Platelets   Date Value Ref Range Status   11/07/2024 226 140 - 450 10*3/mm3 Final   09/13/2024 292 140 - 450 10*3/mm3 Final   04/10/2018 256 140 - 500 10*3/mm3 Final       Pathology:  Tissue Pathology Exam (07/22/2024 08:45)   Tissue Pathology Exam (09/24/2024 09:52)     Imaging:  MRI Breast Bilateral Diagnostic W WO Contrast (08/15/2024 10:56)     Assessment:  *Atypical ductal hyperplasia, right breast  7/9/2024 right diagnostic mammogram-palpable lump or thickening in right breast and post cyst aspiration in left breast at 6:00.  Breast extremely dense.  Finding 1 multiple oval masses of varying size obscuring margins in right breast.  Finding 2 oval mass with obscured margin in right breast correlates with pinpointed palpable area at 1230 o'clock.  Finding 3 amorphous calcifications measuring 5 mm with grouped distribution in posterior one third of right breast at 10:00.  Bilateral breast ultrasound.  Finding 1 multiple similar masses in right breast stable and benign.  Finding 2 palpable masses in right breast benign aspiration can be performed for symptom relief.  Pending 3 calcification right breast suspicious biopsy recommended.  BI-RADS 4A.  7/22/2024 right breast ultrasound-guided cyst aspiration-benign  7/22/2024 right breast 10:00 biopsy for calcifications-atypical ductal hyperplasia  8/15/2024 MRI bilateral breast: 1.7 cm postbiopsy hematoma at 8-9 o'clock in posterior right breast representing site of biopsy-proven atypia.  Biopsy clip located 0.8 cm from periphery of hematoma noted to be displaced on postbiopsy mammogram.  Normal MRI of left breast  9/24/2024 right breast excisional biopsy-negative for residual atypical ductal hyperplasia or flat epithelial atypia.  11/7/2024: Patient established care with me.  We discussed atypical ductal hyperplasia and atypical lobular hyperplasia carries a substantial increase in the risk of subsequent breast cancer (relative risk of 3.7  to 5.3). There is an increase in the risk of both ipsilateral and contralateral breast cancer. In a report from the Nurse's Health Study, 56% of cancers that developed in women with AH occurred in the ipsilateral breast. Invasive breast cancer is 3 times more likely to occur in the ipsilateral breast after ALH is diagnosed compared to the contralateral breast. The cumulative incidence of breast cancer over 30 years approached 35%. Tamoxifen given for 5 years can reduce the risk of breast cancer by 50%. Aromatase inhibitors such as anastrozole and exemestane can reduce the risk of invasive breast cancer by50% compared to placebo in patients with atypical hyperplasia. However, we do not have any data comparing aromatase inhibitors and tamoxifen in atypical hyperplasia. We discussed the side effects of anastrozole including joint pain, hot flashes, other menopausal symptoms and loss of bone mineral density. We also discussed the side effects of tamoxifen including hot flashes, changes in the lens of the eye, increased risk of clotting (0.8%-1.7%) and increased risk of uterine cancer (4%). We also discussed that tamoxifen can increase bone density and improve cholesterol and lipids.  Patient prefers to wait until she is in menopause before she considers chemoprevention    *Right breast nodularity/cyst  Ultrasound-guided referral for cyst aspiration placed at patient's request  Patient reported Dr. Nunn was going to place that referral.    Plan  Discussed chemoprevention as noted above.  Obtain FSH and LH for menopausal status today  Next Mammogram will be due - 1/27/25 ([facility]) and MRIs will be due in August 2025   Follow-up with high risk breast clinic as scheduled in April 2025  Ultrasound-guided right breast cyst aspiration to [facility]  Patient prefers to start chemoprevention menopause.  As such, discussed return to clinic in 2 years  Case also discussed with Dr. Jacques from our group      Addendum  11/8/2024-called the  patient to review her labs.  FSH 85, LH 46.8.  Currently in menopausal range.  We discussed follow-up.  Patient prefers to follow-up with me in March 2025 with repeat labs, to consider endocrine treatment for chemoprevention.

## 2024-11-07 ENCOUNTER — CONSULT (OUTPATIENT)
Dept: ONCOLOGY | Facility: CLINIC | Age: 49
End: 2024-11-07
Payer: COMMERCIAL

## 2024-11-07 ENCOUNTER — LAB (OUTPATIENT)
Dept: LAB | Facility: HOSPITAL | Age: 49
End: 2024-11-07
Payer: COMMERCIAL

## 2024-11-07 VITALS
TEMPERATURE: 98.1 F | WEIGHT: 146.2 LBS | HEART RATE: 71 BPM | OXYGEN SATURATION: 99 % | HEIGHT: 68 IN | RESPIRATION RATE: 16 BRPM | SYSTOLIC BLOOD PRESSURE: 122 MMHG | DIASTOLIC BLOOD PRESSURE: 65 MMHG | BODY MASS INDEX: 22.16 KG/M2

## 2024-11-07 DIAGNOSIS — N60.91 ATYPICAL DUCTAL HYPERPLASIA OF RIGHT BREAST: ICD-10-CM

## 2024-11-07 DIAGNOSIS — N60.91 ATYPICAL DUCTAL HYPERPLASIA OF RIGHT BREAST: Primary | ICD-10-CM

## 2024-11-07 DIAGNOSIS — R92.8 ABNORMAL FINDING ON BREAST IMAGING: ICD-10-CM

## 2024-11-07 DIAGNOSIS — Z98.890 S/P ENDOMETRIAL ABLATION: Primary | ICD-10-CM

## 2024-11-07 DIAGNOSIS — N60.19 CYSTIC BREAST, UNSPECIFIED LATERALITY: ICD-10-CM

## 2024-11-07 LAB
BASOPHILS # BLD AUTO: 0.04 10*3/MM3 (ref 0–0.2)
BASOPHILS NFR BLD AUTO: 0.8 % (ref 0–1.5)
DEPRECATED RDW RBC AUTO: 44.3 FL (ref 37–54)
EOSINOPHIL # BLD AUTO: 0.04 10*3/MM3 (ref 0–0.4)
EOSINOPHIL NFR BLD AUTO: 0.8 % (ref 0.3–6.2)
ERYTHROCYTE [DISTWIDTH] IN BLOOD BY AUTOMATED COUNT: 12.5 % (ref 12.3–15.4)
FSH SERPL-ACNC: 85.4 MIU/ML
HCT VFR BLD AUTO: 40.4 % (ref 34–46.6)
HGB BLD-MCNC: 13.6 G/DL (ref 12–15.9)
IMM GRANULOCYTES # BLD AUTO: 0.03 10*3/MM3 (ref 0–0.05)
IMM GRANULOCYTES NFR BLD AUTO: 0.6 % (ref 0–0.5)
LH SERPL-ACNC: 46.8 MIU/ML
LYMPHOCYTES # BLD AUTO: 1.68 10*3/MM3 (ref 0.7–3.1)
LYMPHOCYTES NFR BLD AUTO: 34.1 % (ref 19.6–45.3)
MCH RBC QN AUTO: 32.5 PG (ref 26.6–33)
MCHC RBC AUTO-ENTMCNC: 33.7 G/DL (ref 31.5–35.7)
MCV RBC AUTO: 96.4 FL (ref 79–97)
MONOCYTES # BLD AUTO: 0.36 10*3/MM3 (ref 0.1–0.9)
MONOCYTES NFR BLD AUTO: 7.3 % (ref 5–12)
NEUTROPHILS NFR BLD AUTO: 2.78 10*3/MM3 (ref 1.7–7)
NEUTROPHILS NFR BLD AUTO: 56.4 % (ref 42.7–76)
NRBC BLD AUTO-RTO: 0 /100 WBC (ref 0–0.2)
PLATELET # BLD AUTO: 226 10*3/MM3 (ref 140–450)
PMV BLD AUTO: 10.1 FL (ref 6–12)
RBC # BLD AUTO: 4.19 10*6/MM3 (ref 3.77–5.28)
WBC NRBC COR # BLD AUTO: 4.93 10*3/MM3 (ref 3.4–10.8)

## 2024-11-07 PROCEDURE — 83002 ASSAY OF GONADOTROPIN (LH): CPT | Performed by: STUDENT IN AN ORGANIZED HEALTH CARE EDUCATION/TRAINING PROGRAM

## 2024-11-07 PROCEDURE — 85025 COMPLETE CBC W/AUTO DIFF WBC: CPT

## 2024-11-07 PROCEDURE — 83001 ASSAY OF GONADOTROPIN (FSH): CPT | Performed by: STUDENT IN AN ORGANIZED HEALTH CARE EDUCATION/TRAINING PROGRAM

## 2024-11-07 PROCEDURE — 36415 COLL VENOUS BLD VENIPUNCTURE: CPT

## 2024-11-11 ENCOUNTER — TELEPHONE (OUTPATIENT)
Dept: ONCOLOGY | Facility: CLINIC | Age: 49
End: 2024-11-11
Payer: COMMERCIAL

## 2024-11-20 ENCOUNTER — APPOINTMENT (OUTPATIENT)
Dept: WOMENS IMAGING | Facility: HOSPITAL | Age: 49
End: 2024-11-20
Payer: COMMERCIAL

## 2024-11-20 ENCOUNTER — LAB REQUISITION (OUTPATIENT)
Dept: LAB | Facility: HOSPITAL | Age: 49
End: 2024-11-20
Payer: COMMERCIAL

## 2024-11-20 DIAGNOSIS — N63.0 UNSPECIFIED LUMP IN UNSPECIFIED BREAST: ICD-10-CM

## 2024-11-20 DIAGNOSIS — N63.11 MASS OF UPPER OUTER QUADRANT OF RIGHT BREAST: Primary | ICD-10-CM

## 2024-11-20 PROCEDURE — 88112 CYTOPATH CELL ENHANCE TECH: CPT | Performed by: STUDENT IN AN ORGANIZED HEALTH CARE EDUCATION/TRAINING PROGRAM

## 2024-11-20 PROCEDURE — 76642 ULTRASOUND BREAST LIMITED: CPT | Performed by: RADIOLOGY

## 2024-11-20 PROCEDURE — 88305 TISSUE EXAM BY PATHOLOGIST: CPT | Performed by: STUDENT IN AN ORGANIZED HEALTH CARE EDUCATION/TRAINING PROGRAM

## 2024-11-20 PROCEDURE — 19000 PUNCTURE ASPIR CYST BREAST: CPT | Performed by: RADIOLOGY

## 2024-11-21 ENCOUNTER — TELEPHONE (OUTPATIENT)
Dept: SURGERY | Facility: CLINIC | Age: 49
End: 2024-11-21
Payer: COMMERCIAL

## 2024-11-21 ENCOUNTER — TELEPHONE (OUTPATIENT)
Dept: ONCOLOGY | Facility: CLINIC | Age: 49
End: 2024-11-21
Payer: COMMERCIAL

## 2024-11-21 NOTE — TELEPHONE ENCOUNTER
----- Message from Tory Jenkins sent at 11/21/2024  9:16 AM EST -----  Let the patient know right breast cyst aspiration was negative for any cancer

## 2024-11-21 NOTE — TELEPHONE ENCOUNTER
Left message to let patient know her cyst aspiration was negative for any cancer cells. Requested that she call back to RN direct line, [phone]

## 2024-11-21 NOTE — TELEPHONE ENCOUNTER
Called and spoke with patient let her know her cyst aspiration that was done yesterday at [facility] came back with benign cyst contents.      [place]

## 2024-11-21 NOTE — TELEPHONE ENCOUNTER
Received call back from Geri lettmarco antonio RN know she had received the message and was grateful for the call.

## 2024-11-22 LAB
DX PRELIMINARY: NORMAL
LAB AP CASE REPORT: NORMAL
PATH REPORT.FINAL DX SPEC: NORMAL
PATH REPORT.GROSS SPEC: NORMAL

## 2025-01-30 ENCOUNTER — PREP FOR SURGERY (OUTPATIENT)
Dept: SURGERY | Facility: SURGERY CENTER | Age: 50
End: 2025-01-30
Payer: COMMERCIAL

## 2025-01-30 ENCOUNTER — TELEPHONE (OUTPATIENT)
Dept: INTERNAL MEDICINE | Facility: CLINIC | Age: 50
End: 2025-01-30

## 2025-01-30 ENCOUNTER — TELEPHONE (OUTPATIENT)
Dept: GASTROENTEROLOGY | Facility: CLINIC | Age: 50
End: 2025-01-30
Payer: COMMERCIAL

## 2025-01-30 ENCOUNTER — OFFICE VISIT (OUTPATIENT)
Dept: INTERNAL MEDICINE | Facility: CLINIC | Age: 50
End: 2025-01-30
Payer: COMMERCIAL

## 2025-01-30 VITALS
BODY MASS INDEX: 22.19 KG/M2 | HEART RATE: 60 BPM | TEMPERATURE: 98 F | HEIGHT: 68 IN | OXYGEN SATURATION: 99 % | DIASTOLIC BLOOD PRESSURE: 70 MMHG | SYSTOLIC BLOOD PRESSURE: 104 MMHG | WEIGHT: 146.4 LBS

## 2025-01-30 DIAGNOSIS — Z83.719 FAMILY HISTORY OF COLONIC POLYPS: ICD-10-CM

## 2025-01-30 DIAGNOSIS — Z00.00 ENCOUNTER FOR WELLNESS EXAMINATION IN ADULT: ICD-10-CM

## 2025-01-30 DIAGNOSIS — Z12.11 ENCOUNTER FOR SCREENING COLONOSCOPY: Primary | ICD-10-CM

## 2025-01-30 DIAGNOSIS — L71.9 ROSACEA: Chronic | ICD-10-CM

## 2025-01-30 DIAGNOSIS — Z76.89 ENCOUNTER TO ESTABLISH CARE: Primary | ICD-10-CM

## 2025-01-30 DIAGNOSIS — Z12.11 SCREENING FOR MALIGNANT NEOPLASM OF COLON: ICD-10-CM

## 2025-01-30 DIAGNOSIS — N60.91 ATYPICAL DUCTAL HYPERPLASIA OF RIGHT BREAST: ICD-10-CM

## 2025-01-30 PROBLEM — Z87.42 HISTORY OF MENORRHAGIA: Status: RESOLVED | Noted: 2018-04-10 | Resolved: 2025-01-30

## 2025-01-30 PROCEDURE — 99386 PREV VISIT NEW AGE 40-64: CPT | Performed by: NURSE PRACTITIONER

## 2025-01-30 RX ORDER — SODIUM CHLORIDE, SODIUM LACTATE, POTASSIUM CHLORIDE, CALCIUM CHLORIDE 600; 310; 30; 20 MG/100ML; MG/100ML; MG/100ML; MG/100ML
30 INJECTION, SOLUTION INTRAVENOUS CONTINUOUS PRN
OUTPATIENT
Start: 2025-01-30 | End: 2025-01-31

## 2025-01-30 RX ORDER — SODIUM CHLORIDE 0.9 % (FLUSH) 0.9 %
3 SYRINGE (ML) INJECTION EVERY 12 HOURS SCHEDULED
OUTPATIENT
Start: 2025-01-30

## 2025-01-30 RX ORDER — SODIUM CHLORIDE 0.9 % (FLUSH) 0.9 %
10 SYRINGE (ML) INJECTION AS NEEDED
OUTPATIENT
Start: 2025-01-30

## 2025-01-30 NOTE — PROGRESS NOTES
"Subjective    Geri Romeo is a 49 y.o. female presenting today for   Chief Complaint   Patient presents with    Establish Care    Annual Exam         Geri Romeo presents today as a new patient to me to establish care.     Prior PCP was no one for many years.    Patient Care Team:  Asuncion Baugh APRN as PCP - General (Family Medicine)  Andre Foote MD as Consulting Physician (Plastic Surgery)  Coleen Nunn MD as Referring Physician (Breast Surgery)  Tory Jenkins MD as Consulting Physician (Hematology and Oncology)  [facility] (Gynecology)  Yakelin Franks MD as Consulting Physician (Dermatology)      Current/chronic health conditions include:    Patient Active Problem List   Diagnosis    Acne vulgaris    Cystic breast, unspecified laterality    Solitary cyst of right breast    Atypical ductal hyperplasia of right breast    Rosacea       Outpatient Medications Marked as Taking for the 1/30/25 encounter (Office Visit) with Asuncion Baugh APRN   Medication Sig Dispense Refill    doxycycline (VIBRAMYCIN) 50 MG capsule Take 1 capsule by mouth As Needed.           Nutrition: mostly fish and poultry; fruits, veggies  Exercise: \"I go in fits and spurts.\" 2x/wk walking. Started hot yoga at [employer].    PAP: per GYN  Mammo: UTD; f/b breast surgery  CRC: no prior screening  Lung: no h/o tobacco          The following portions of the patient's history were reviewed and updated as appropriate: allergies, current medications, problem list, past medical history, past surgical history, family history, and social history.     Review of Systems   Constitutional: Negative.    HENT: Negative.     Eyes: Negative.    Respiratory: Negative.     Cardiovascular: Negative.    Gastrointestinal: Negative.    Endocrine: Negative.    Genitourinary: Negative.    Musculoskeletal: Negative.    Skin: Negative.    Neurological: Negative.    Hematological: Negative.    Psychiatric/Behavioral: Negative.   " "      Objective    Vitals:    01/30/25 0855   BP: 104/70   BP Location: Left arm   Patient Position: Sitting   Cuff Size: Adult   Pulse: 60   Temp: 98 °F (36.7 °C)   TempSrc: Infrared   SpO2: 99%   Weight: 66.4 kg (146 lb 6.4 oz)   Height: 172.7 cm (68\")     Body mass index is 22.26 kg/m².  Nursing notes and vitals reviewed.    Physical Exam  Constitutional:       General: She is not in acute distress.     Appearance: Normal appearance. She is well-developed.   HENT:      Head: Normocephalic.      Right Ear: Hearing, tympanic membrane, ear canal and external ear normal.      Left Ear: Hearing, tympanic membrane, ear canal and external ear normal.      Nose: Nose normal. No mucosal edema or rhinorrhea.      Mouth/Throat:      Mouth: Mucous membranes are moist.      Pharynx: Oropharynx is clear. Uvula midline.   Eyes:      General: Lids are normal.      Extraocular Movements: Extraocular movements intact.      Conjunctiva/sclera: Conjunctivae normal.      Pupils: Pupils are equal, round, and reactive to light.   Neck:      Thyroid: No thyroid mass or thyromegaly.   Cardiovascular:      Rate and Rhythm: Regular rhythm.      Pulses: Normal pulses.      Heart sounds: S1 normal and S2 normal. No murmur heard.     No friction rub. No gallop.   Pulmonary:      Effort: Pulmonary effort is normal.      Breath sounds: Normal breath sounds. No wheezing, rhonchi or rales.   Abdominal:      General: Bowel sounds are normal.      Palpations: Abdomen is soft.      Tenderness: There is no abdominal tenderness. There is no guarding.      Hernia: No hernia is present.   Musculoskeletal:         General: No deformity. Normal range of motion.      Cervical back: Normal range of motion and neck supple.   Lymphadenopathy:      Cervical: No cervical adenopathy.   Skin:     General: Skin is warm and dry.      Findings: No lesion or rash.   Neurological:      General: No focal deficit present.      Mental Status: She is alert and oriented to " person, place, and time.      Cranial Nerves: No cranial nerve deficit.      Sensory: No sensory deficit.      Motor: Motor function is intact.      Coordination: Coordination is intact.      Gait: Gait normal.      Deep Tendon Reflexes: Reflexes are normal and symmetric.   Psychiatric:         Attention and Perception: She is attentive.         Mood and Affect: Mood and affect normal.         Speech: Speech normal.         Behavior: Behavior normal.         Thought Content: Thought content normal.           Data Reviewed:    Recent Results (from the past 20 weeks)   Basic Metabolic Panel    Collection Time: 09/13/24  1:06 PM    Specimen: Blood   Result Value Ref Range    Glucose 129 (H) 65 - 99 mg/dL    BUN 16 6 - 20 mg/dL    Creatinine 0.73 0.57 - 1.00 mg/dL    Sodium 136 136 - 145 mmol/L    Potassium 4.2 3.5 - 5.2 mmol/L    Chloride 100 98 - 107 mmol/L    CO2 27.0 22.0 - 29.0 mmol/L    Calcium 9.9 8.6 - 10.5 mg/dL    BUN/Creatinine Ratio 21.9 7.0 - 25.0    Anion Gap 9.0 5.0 - 15.0 mmol/L    eGFR 101.0 >60.0 mL/min/1.73   CBC Auto Differential    Collection Time: 09/13/24  1:06 PM    Specimen: Blood   Result Value Ref Range    WBC 7.57 3.40 - 10.80 10*3/mm3    RBC 4.05 3.77 - 5.28 10*6/mm3    Hemoglobin 13.1 12.0 - 15.9 g/dL    Hematocrit 39.4 34.0 - 46.6 %    MCV 97.3 (H) 79.0 - 97.0 fL    MCH 32.3 26.6 - 33.0 pg    MCHC 33.2 31.5 - 35.7 g/dL    RDW 11.8 (L) 12.3 - 15.4 %    RDW-SD 42.0 37.0 - 54.0 fl    MPV 9.6 6.0 - 12.0 fL    Platelets 292 140 - 450 10*3/mm3    Neutrophil % 79.8 (H) 42.7 - 76.0 %    Lymphocyte % 14.9 (L) 19.6 - 45.3 %    Monocyte % 4.8 (L) 5.0 - 12.0 %    Eosinophil % 0.0 (L) 0.3 - 6.2 %    Basophil % 0.1 0.0 - 1.5 %    Immature Grans % 0.4 0.0 - 0.5 %    Neutrophils, Absolute 6.04 1.70 - 7.00 10*3/mm3    Lymphocytes, Absolute 1.13 0.70 - 3.10 10*3/mm3    Monocytes, Absolute 0.36 0.10 - 0.90 10*3/mm3    Eosinophils, Absolute 0.00 0.00 - 0.40 10*3/mm3    Basophils, Absolute 0.01 0.00 - 0.20  10*3/mm3    Immature Grans, Absolute 0.03 0.00 - 0.05 10*3/mm3    nRBC 0.0 0.0 - 0.2 /100 WBC   POC Pregnancy, Urine    Collection Time: 09/24/24 12:49 AM    Specimen: Urine   Result Value Ref Range    HCG, Urine, QL Negative Negative    Lot Number [id number]     Internal Positive Control Positive Positive, Passed    Internal Negative Control Negative Negative, Passed    Expiration Date 9,148,907    Tissue Pathology Exam    Collection Time: 09/24/24  9:52 AM    Specimen: A: Breast, Right; Tissue    B: Breast, Right; Tissue   Result Value Ref Range    Case Report       Surgical Pathology Report                         Case: [id number]                                  Authorizing Provider:  Coleen Nunn MD           Collected:           09/24/2024 09:52 AM          Ordering Location:     [facility]  Received:            09/24/2024 10:12 AM                                 MAIN OR                                                                      Pathologist:           Charlotte Leigh MD                                                        Specimens:   1) - Breast, Right, Right breast partial mastectomy; short stitch superior, long                    stitch lateral, ink marks posterior                                                                 2) - Breast, Right, Right breast superior margin; stitch marks true margin                 Final Diagnosis       1.   Right breast, excisional biopsy:          A.  Negative for residual atypical ductal hyperplasia or flat epithelial atypia.          B.  Benign breast parenchyma with columnar cell change and associated microcalcifications.         C.  Biopsy site changes present.    2.  Right breast,  additional superior margin (inked and oriented), excision:         A.  Negative for residual atypical ductal hyperplasia or flat epithelial atypia.         B.  Benign breast parenchyma with focal columnar cell change and microcalcifications                "associated with benign lobular units.         C.  Clip and biopsy site changes present.      Comment       Dr. Shilpa Jensen reviewed select slides and concurs with the above diagnosis.      Gross Description       1. Breast, Right.  Received in formalin labeled \"right breast partial mastectomy\" is a 9 g irregular portion of fibrofatty breast tissue with no needle localization wire.  There is a short stitch denoting the superior margin, a long stitch denoting the lateral margin, and ink on the posterior margin.  The specimen has the following dimensions: Anterior to posterior-4.2 cm, medial to lateral-2 cm, superior to inferior-2.5 cm.    The margins are differentially inked as follows: Anterior-green, inferior-blue, lateral-orange, medial-yellow, posterior-black, superior-red.    The specimen is x-rayed by the Faxitron machine to reveal a Kasandra clip is within the specimen, however a biopsy clip is not within the specimen.  The specimen is serially sectioned from anterior to posterior into 7 approximately 5 mm slices and subsequently x-rayed by the Faxitron machine to reveal the Kasandra clip is within slice #2 and is within slightly erythematous/biopsy site which is grossly visualized and slices 1 and 2, measuring approximately 1 cm from anterior to posterior, 0.8 cm from superior to inferior, and 0.8 cm from medial to lateral.  Biopsy site is focally present at the anterior, medial, lateral and inferior margins and comes to within 0.8 cm of the superior margin and 2.7 cm of the posterior margin.  The specimen is entirely submitted sequentially from anterior to posterior as follows:    1A- possible biopsy site perpendicular to the anterior margin, slice #1, inferior half  1B- remainder of the anterior margin perpendicular, superior half, slice #1  1C-1G- center sections sequentially from anterior to posterior, beginning with slice #2 and ending with slice #6, to include the superior, inferior, medial and lateral " "margins (1C includes the remainder the possible biopsy site, Kasandra clip retrieved)  1H-1I- posterior margin perpendicular, slice #7, sequentially from superior to inferior    Time collected-952  Cold ischemia time-6 minutes  Total formalin fixation time-10 hours and 36 minutes    jap/uso/vas  2. Breast, Right.  Received  in formalin, labeled \"right breast superior margin\" is a 3.6 x 1.2 x 1.6 (depth)cm irregular portion of fibrous tissue with a suture designating the new true margin.  The specimen is x-rayed by the [employer] machine to reveal a Kasandra clip and a Top-Hat shaped clip are within the specimen.  The new true margin is inked and the specimen is serially sectioned into 9 approximately 4 mm slices to reveal both clips are within slice #7 and are within unremarkable fibrous tissue.  The clips comes to within 0.5 cm of the new true margin.  The specimen is comprised of 99% fibrous tissue.  The specimen is entirely submitted sequential fashion from slice #1 to slice #9 as 2A-2I (2G-biopsy site, both clips retrieved).      Time collected-1003  Cold ischemia time-1 minute  Total formalin fixation time-10 hours and 56 minutes    jap/uso/vas          Microscopic Description       Unless \"gross only\" is specified, the final diagnosis for each specimen is based on microscopic examination of tissue.     CBC Auto Differential    Collection Time: 11/07/24 10:26 AM    Specimen: Blood   Result Value Ref Range    WBC 4.93 3.40 - 10.80 10*3/mm3    RBC 4.19 3.77 - 5.28 10*6/mm3    Hemoglobin 13.6 12.0 - 15.9 g/dL    Hematocrit 40.4 34.0 - 46.6 %    MCV 96.4 79.0 - 97.0 fL    MCH 32.5 26.6 - 33.0 pg    MCHC 33.7 31.5 - 35.7 g/dL    RDW 12.5 12.3 - 15.4 %    RDW-SD 44.3 37.0 - 54.0 fl    MPV 10.1 6.0 - 12.0 fL    Platelets 226 140 - 450 10*3/mm3    Neutrophil % 56.4 42.7 - 76.0 %    Lymphocyte % 34.1 19.6 - 45.3 %    Monocyte % 7.3 5.0 - 12.0 %    Eosinophil % 0.8 0.3 - 6.2 %    Basophil % 0.8 0.0 - 1.5 %    Immature Grans % 0.6 " (H) 0.0 - 0.5 %    Neutrophils, Absolute 2.78 1.70 - 7.00 10*3/mm3    Lymphocytes, Absolute 1.68 0.70 - 3.10 10*3/mm3    Monocytes, Absolute 0.36 0.10 - 0.90 10*3/mm3    Eosinophils, Absolute 0.04 0.00 - 0.40 10*3/mm3    Basophils, Absolute 0.04 0.00 - 0.20 10*3/mm3    Immature Grans, Absolute 0.03 0.00 - 0.05 10*3/mm3    nRBC 0.0 0.0 - 0.2 /100 WBC   FSH & LH    Collection Time: 11/07/24 11:11 AM    Specimen: Blood   Result Value Ref Range    FSH 85.40 mIU/mL    LH 46.80 mIU/mL   Non-gynecologic Cytology    Collection Time: 11/20/24 10:06 AM    Specimen: Breast, Right; Body Fluid   Result Value Ref Range    Case Report       Surgical Pathology Report                         Case: [id number]                                  Authorizing Provider:  Coleen Nunn MD           Collected:           11/20/2024 10:06 AM          Ordering Location:     [facility]  Received:            11/20/2024 11:22 AM                                 LABORATORY                                                                   Pathologist:           Maritza Carlin MD                                                    Specimen:    Breast, Right, 12-1:00 cyst aspiration                                                     Final Diagnosis       Right Breast, 12-1:00, Aspiration:   A. Negative for malignant cells.   B. Abundant macrophages, ductal and apocrine cells consistent with cyst contents.    [facility]      Preliminary Diagnosis       Right Breast, 12-1:00, Aspiration:   A. Negative for malignant cells.   B. Abundant macrophages, ductal and apocrine cells consistent with cyst contents.    [facility]      Gross Description       1. Breast, Right.  1 ThinPrep container with specimen received. ThinPrep(1) and cellblock-in formalin at 1140 on 11/20/24.                   Assessment & Plan  Encounter to establish care         Encounter for wellness examination in adult  Preventative counseling completed including relevant screenings,  appropriate vaccinations, healthy nutrition, and appropriate physical activity. Age-appropriate Screening & Interventions recommended by USPSTF were reviewed with the patient, and Health Maintenance was updated in Epic.    Orders:    Comprehensive Metabolic Panel    Lipid Panel With / Chol / HDL Ratio    TSH    Hepatitis C Antibody    Screening for malignant neoplasm of colon    Orders:    Ambulatory Referral For Screening Colonoscopy    Atypical ductal hyperplasia of right breast  - f/b Oncology and Breast surgery  - plans to begin Tamoxifen in March       Rosacea  - f/b Derm   - has Rx for Doxycycline that she uses very rarely             The plan of care was discussed. All questions were answered. Patient verbalized understanding.        Return - fasting labs ASAP.

## 2025-01-30 NOTE — TELEPHONE ENCOUNTER
Caller: Geri Romeo    Relationship to patient: Self    Best call back number: [phone]     Type of visit: LABS    Requested date: SOMETIME NEXT WEEK    If rescheduling, when is the original appointment: 1/31/25     Additional notes:PATIENT REQUESTS A CALL BACK TO RESCHEDULE LABS

## 2025-01-30 NOTE — TELEPHONE ENCOUNTER
FIRST COLONOSCOPY  SCREENING  FAMILY HISTORY: MOTHER/POLYP/60'S                                  FATHER/POLYP/60'S  DID NOT LUCIA ANY  SCHEDULE AT [facility]      *ONLY SENT BACKSIDE OF FAST TRACK

## 2025-01-31 ENCOUNTER — APPOINTMENT (OUTPATIENT)
Dept: WOMENS IMAGING | Facility: HOSPITAL | Age: 50
End: 2025-01-31
Payer: COMMERCIAL

## 2025-01-31 PROCEDURE — 77063 BREAST TOMOSYNTHESIS BI: CPT | Performed by: RADIOLOGY

## 2025-01-31 PROCEDURE — 77067 SCR MAMMO BI INCL CAD: CPT | Performed by: RADIOLOGY

## 2025-02-18 ENCOUNTER — APPOINTMENT (OUTPATIENT)
Dept: WOMENS IMAGING | Facility: HOSPITAL | Age: 50
End: 2025-02-18
Payer: COMMERCIAL

## 2025-02-18 PROCEDURE — G0279 TOMOSYNTHESIS, MAMMO: HCPCS | Performed by: RADIOLOGY

## 2025-02-18 PROCEDURE — 77065 DX MAMMO INCL CAD UNI: CPT | Performed by: RADIOLOGY

## 2025-02-18 PROCEDURE — 77061 BREAST TOMOSYNTHESIS UNI: CPT | Performed by: RADIOLOGY

## 2025-03-04 ENCOUNTER — ANESTHESIA EVENT (OUTPATIENT)
Dept: PERIOP | Facility: HOSPITAL | Age: 50
End: 2025-03-04
Payer: COMMERCIAL

## 2025-03-05 ENCOUNTER — ANESTHESIA (OUTPATIENT)
Dept: PERIOP | Facility: HOSPITAL | Age: 50
End: 2025-03-05
Payer: COMMERCIAL

## 2025-03-05 ENCOUNTER — HOSPITAL ENCOUNTER (OUTPATIENT)
Facility: HOSPITAL | Age: 50
Setting detail: HOSPITAL OUTPATIENT SURGERY
Discharge: HOME | End: 2025-03-05
Attending: STUDENT IN AN ORGANIZED HEALTH CARE EDUCATION/TRAINING PROGRAM | Admitting: STUDENT IN AN ORGANIZED HEALTH CARE EDUCATION/TRAINING PROGRAM
Payer: COMMERCIAL

## 2025-03-05 VITALS
DIASTOLIC BLOOD PRESSURE: 64 MMHG | HEART RATE: 49 BPM | TEMPERATURE: 98 F | OXYGEN SATURATION: 100 % | RESPIRATION RATE: 16 BRPM | WEIGHT: 146.4 LBS | SYSTOLIC BLOOD PRESSURE: 115 MMHG | HEIGHT: 68 IN | BODY MASS INDEX: 22.19 KG/M2

## 2025-03-05 DIAGNOSIS — Z12.11 ENCOUNTER FOR SCREENING COLONOSCOPY: ICD-10-CM

## 2025-03-05 DIAGNOSIS — Z83.719 FAMILY HISTORY OF COLONIC POLYPS: ICD-10-CM

## 2025-03-05 PROCEDURE — 25810000003 LACTATED RINGERS PER 1000 ML: Performed by: NURSE ANESTHETIST, CERTIFIED REGISTERED

## 2025-03-05 PROCEDURE — 25010000002 LIDOCAINE 2% SOLUTION: Performed by: NURSE ANESTHETIST, CERTIFIED REGISTERED

## 2025-03-05 PROCEDURE — 88305 TISSUE EXAM BY PATHOLOGIST: CPT | Performed by: STUDENT IN AN ORGANIZED HEALTH CARE EDUCATION/TRAINING PROGRAM

## 2025-03-05 PROCEDURE — 45380 COLONOSCOPY AND BIOPSY: CPT | Performed by: STUDENT IN AN ORGANIZED HEALTH CARE EDUCATION/TRAINING PROGRAM

## 2025-03-05 PROCEDURE — 25010000002 PROPOFOL 200 MG/20ML EMULSION: Performed by: NURSE ANESTHETIST, CERTIFIED REGISTERED

## 2025-03-05 RX ORDER — ONDANSETRON 2 MG/ML
4 INJECTION INTRAMUSCULAR; INTRAVENOUS ONCE AS NEEDED
Status: DISCONTINUED | OUTPATIENT
Start: 2025-03-05 | End: 2025-03-05 | Stop reason: HOSPADM

## 2025-03-05 RX ORDER — LIDOCAINE HYDROCHLORIDE 10 MG/ML
0.5 INJECTION, SOLUTION EPIDURAL; INFILTRATION; INTRACAUDAL; PERINEURAL ONCE AS NEEDED
Status: DISCONTINUED | OUTPATIENT
Start: 2025-03-05 | End: 2025-03-05 | Stop reason: HOSPADM

## 2025-03-05 RX ORDER — SODIUM CHLORIDE 0.9 % (FLUSH) 0.9 %
3 SYRINGE (ML) INJECTION EVERY 12 HOURS SCHEDULED
Status: DISCONTINUED | OUTPATIENT
Start: 2025-03-05 | End: 2025-03-05 | Stop reason: HOSPADM

## 2025-03-05 RX ORDER — SODIUM CHLORIDE 0.9 % (FLUSH) 0.9 %
10 SYRINGE (ML) INJECTION AS NEEDED
Status: DISCONTINUED | OUTPATIENT
Start: 2025-03-05 | End: 2025-03-05 | Stop reason: HOSPADM

## 2025-03-05 RX ORDER — LIDOCAINE HYDROCHLORIDE 20 MG/ML
INJECTION, SOLUTION INFILTRATION; PERINEURAL AS NEEDED
Status: DISCONTINUED | OUTPATIENT
Start: 2025-03-05 | End: 2025-03-05 | Stop reason: SURG

## 2025-03-05 RX ORDER — SODIUM CHLORIDE, SODIUM LACTATE, POTASSIUM CHLORIDE, CALCIUM CHLORIDE 600; 310; 30; 20 MG/100ML; MG/100ML; MG/100ML; MG/100ML
30 INJECTION, SOLUTION INTRAVENOUS CONTINUOUS PRN
Status: DISCONTINUED | OUTPATIENT
Start: 2025-03-05 | End: 2025-03-05 | Stop reason: HOSPADM

## 2025-03-05 RX ORDER — SODIUM CHLORIDE 0.9 % (FLUSH) 0.9 %
10 SYRINGE (ML) INJECTION EVERY 12 HOURS SCHEDULED
Status: DISCONTINUED | OUTPATIENT
Start: 2025-03-05 | End: 2025-03-05 | Stop reason: HOSPADM

## 2025-03-05 RX ORDER — SODIUM CHLORIDE, SODIUM LACTATE, POTASSIUM CHLORIDE, CALCIUM CHLORIDE 600; 310; 30; 20 MG/100ML; MG/100ML; MG/100ML; MG/100ML
9 INJECTION, SOLUTION INTRAVENOUS CONTINUOUS
Status: DISCONTINUED | OUTPATIENT
Start: 2025-03-05 | End: 2025-03-05 | Stop reason: HOSPADM

## 2025-03-05 RX ORDER — PROPOFOL 10 MG/ML
INJECTION, EMULSION INTRAVENOUS AS NEEDED
Status: DISCONTINUED | OUTPATIENT
Start: 2025-03-05 | End: 2025-03-05 | Stop reason: SURG

## 2025-03-05 RX ORDER — SODIUM CHLORIDE, SODIUM LACTATE, POTASSIUM CHLORIDE, CALCIUM CHLORIDE 600; 310; 30; 20 MG/100ML; MG/100ML; MG/100ML; MG/100ML
100 INJECTION, SOLUTION INTRAVENOUS ONCE
Status: DISCONTINUED | OUTPATIENT
Start: 2025-03-05 | End: 2025-03-05 | Stop reason: HOSPADM

## 2025-03-05 RX ORDER — SODIUM CHLORIDE 9 MG/ML
40 INJECTION, SOLUTION INTRAVENOUS AS NEEDED
Status: DISCONTINUED | OUTPATIENT
Start: 2025-03-05 | End: 2025-03-05 | Stop reason: HOSPADM

## 2025-03-05 RX ADMIN — PROPOFOL 100 MG: 10 INJECTION, EMULSION INTRAVENOUS at 08:56

## 2025-03-05 RX ADMIN — PROPOFOL 100 MG: 10 INJECTION, EMULSION INTRAVENOUS at 09:01

## 2025-03-05 RX ADMIN — LIDOCAINE HYDROCHLORIDE 100 MG: 20 INJECTION, SOLUTION INFILTRATION; PERINEURAL at 08:56

## 2025-03-05 RX ADMIN — SODIUM CHLORIDE, SODIUM LACTATE, POTASSIUM CHLORIDE, CALCIUM CHLORIDE 9 ML/HR: 20; 30; 600; 310 INJECTION, SOLUTION INTRAVENOUS at 07:38

## 2025-03-05 NOTE — ANESTHESIA POSTPROCEDURE EVALUATION
Patient: Geri Romeo    Procedure Summary       Date: 03/05/25 Room / Location: [facility] ENDOSCOPY 2 /  [facility] OR    Anesthesia Start: 0847 Anesthesia Stop: 0916    Procedure: COLONOSCOPY WITH POLYPECTOMY Diagnosis:       Encounter for screening colonoscopy      Family history of colonic polyps      (Encounter for screening colonoscopy [Z12.11])      (Family history of colonic polyps [Z83.719])    Surgeons: Juan Miguel Castañeda MD Provider: Connor Dickens CRNA    Anesthesia Type: MAC ASA Status: 1            Anesthesia Type: MAC    Vitals  Vitals Value Taken Time   /64 03/05/25 0940   Temp 98 °F (36.7 °C) 03/05/25 0918   Pulse 48 03/05/25 0942   Resp     SpO2 98 % 03/05/25 0942   Vitals shown include unfiled device data.        Post Anesthesia Care and Evaluation    Patient location during evaluation: PHASE II  Patient participation: complete - patient participated  Level of consciousness: awake and alert  Pain score: 0  Pain management: adequate    Airway patency: patent  Anesthetic complications: No anesthetic complications  PONV Status: none  Cardiovascular status: acceptable  Respiratory status: acceptable  Hydration status: acceptable

## 2025-03-05 NOTE — H&P
Patient Care Team:  Asuncion Baugh APRN as PCP - General (Family Medicine)  Andre Foote MD as Consulting Physician (Plastic Surgery)  Coleen Nunn MD as Referring Physician (Breast Surgery)  Tory Jenkins MD as Consulting Physician (Hematology and Oncology)  [facility] (Gynecology)  Yakelin Franks MD as Consulting Physician (Dermatology)    CHIEF COMPLAINT: Screening CRC    HISTORY OF PRESENT ILLNESS:  For average risk screening.     Past Medical History:   Diagnosis Date    Atypical ductal hyperplasia of right breast     PONV (postoperative nausea and vomiting)     Rosacea      Past Surgical History:   Procedure Laterality Date    APPENDECTOMY  1984    BREAST LUMPECTOMY Right 09/24/2024    Procedure: right breast partial mastectomy, analilia guided;  Surgeon: Coleen Nunn MD;  Location: [facility];  Service: General;  Laterality: Right;    CYST REMOVAL  2015    SCALP    ENDOMETRIAL ABLATION  2022    LACERATION REPAIR Right 06/2024    FOOT     Family History   Problem Relation Age of Onset    No Known Problems Mother     No Known Problems Father     No Known Problems Brother         (twin)    Leukemia Brother         CLL    Parkinsonism Maternal Grandmother     Coronary artery disease Maternal Grandfather     Stroke Maternal Grandfather     No Known Problems Paternal Grandmother     Glaucoma Paternal Grandfather     Malig Hyperthermia Neg Hx      Social History     Tobacco Use    Smoking status: Never     Passive exposure: Never    Smokeless tobacco: Never   Vaping Use    Vaping status: Never Used   Substance Use Topics    Alcohol use: Yes     Alcohol/week: 2.0 standard drinks of alcohol     Types: 2 Glasses of wine per week     Comment: OCCASIONAL    Drug use: No     Medications Prior to Admission   Medication Sig Dispense Refill Last Dose/Taking    doxycycline (VIBRAMYCIN) 50 MG capsule Take 1 capsule by mouth As Needed.   Past Month     Allergies:  Patient has no known allergies.    REVIEW OF  "SYSTEMS:  Please see the above history of present illness for pertinent positives and negatives.  The remainder of the patient's systems have been reviewed and are negative.     Vital Signs  Temp:  [98.2 °F (36.8 °C)] 98.2 °F (36.8 °C)  Heart Rate:  [73] 73  Resp:  [16] 16  BP: (105)/(59) 105/59    Flowsheet Rows      Flowsheet Row First Filed Value   Admission Height 172.7 cm (68\") Documented at 03/05/2025 0733   Admission Weight 66.4 kg (146 lb 6.4 oz) Documented at 03/05/2025 0733             Physical Exam:  Physical Exam   Constitutional: Patient appears well-developed and well-nourished and in no acute distress   HEENT:   Head: Normocephalic and atraumatic.   Eyes:  Pupils are equal, round, and reactive to light. EOM are intact. Sclerae are anicteric and non-injected.  Mouth and Throat: Patient has moist mucous membranes. Oropharynx is clear of any erythema or exudate.     Neck: Neck supple. No JVD present. No thyromegaly present. No lymphadenopathy present.  Cardiovascular: Regular rate, regular rhythm, S1 normal and S2 normal.  Exam reveals no gallop and no friction rub.  No murmur heard.  Pulmonary/Chest: Lungs are clear to auscultation bilaterally. No respiratory distress. No wheezes. No rhonchi. No rales.   Abdominal: Soft. Bowel sounds are normal. No distension and no mass. There is no hepatosplenomegaly. There is no tenderness.   Musculoskeletal: Normal Muscle tone  Extremities: No edema. Pulses are palpable in all 4 extremities.  Neurological: Patient is alert and oriented to person, place, and time. Cranial nerves II-XII are grossly intact with no focal deficits.  Skin: Skin is warm. No rash noted. Nails show no clubbing.  No cyanosis or erythema.    Debilities/Disabilities Identified: None  Emotional Behavior: Appropriate     Results Review:   I reviewed the patient's new clinical results.    Lab Results (most recent)       None            Imaging Results (Most Recent)       None      "     reviewed    ECG/EMG Results (most recent)       None          reviewed    Assessment & Plan   Screening CRC/  colonoscopy      I discussed the patient's findings and my recommendations with patient.     Juan Miguel Castañeda MD  03/05/25  08:55 EST    Time: 10 min prior to procedure.

## 2025-03-05 NOTE — ANESTHESIA PREPROCEDURE EVALUATION
Anesthesia Evaluation     Patient summary reviewed and Nursing notes reviewed   history of anesthetic complications:  PONV  NPO Solid Status: > 8 hours  NPO Liquid Status: > 8 hours           Airway   Mallampati: I  TM distance: >3 FB  Neck ROM: full  No difficulty expected  Dental      Comment: Permanent retainer bottom row        Pulmonary - negative pulmonary ROS and normal exam    breath sounds clear to auscultation  (-) not a smoker  Cardiovascular - negative cardio ROS  Exercise tolerance: excellent (>7 METS)    Rhythm: regular  Rate: normal        Neuro/Psych- negative ROS  GI/Hepatic/Renal/Endo - negative ROS     ROS Comment: Appendectomy      Musculoskeletal     Abdominal    Substance History   (+) alcohol use (weekly)  (-) drug use     OB/GYN      Comment: Endometrial ablation        Other        ROS/Med Hx Other: R breast partial mastectomy no lymph nodes removed     Cyst removal removed from back of head (superficial)                 Anesthesia Plan    ASA 1     MAC       Anesthetic plan, risks, benefits, and alternatives have been provided, discussed and informed consent has been obtained with: patient.  Pre-procedure education provided  Use of blood products discussed with patient  Consented to blood products.    Plan discussed with CRNA.    CODE STATUS:

## 2025-03-06 ENCOUNTER — LAB REQUISITION (OUTPATIENT)
Dept: LAB | Facility: HOSPITAL | Age: 50
End: 2025-03-06
Payer: COMMERCIAL

## 2025-03-06 ENCOUNTER — APPOINTMENT (OUTPATIENT)
Dept: WOMENS IMAGING | Facility: HOSPITAL | Age: 50
End: 2025-03-06
Payer: COMMERCIAL

## 2025-03-06 ENCOUNTER — HOSPITAL ENCOUNTER (OUTPATIENT)
Dept: MAMMOGRAPHY | Facility: HOSPITAL | Age: 50
Discharge: HOME | End: 2025-03-06
Payer: COMMERCIAL

## 2025-03-06 DIAGNOSIS — R92.1 MAMMOGRAPHIC CALCIFICATION FOUND ON DIAGNOSTIC IMAGING OF BREAST: ICD-10-CM

## 2025-03-06 DIAGNOSIS — R92.1 BREAST CALCIFICATION, LEFT: ICD-10-CM

## 2025-03-06 LAB
CYTO UR: NORMAL
LAB AP CASE REPORT: NORMAL
PATH REPORT.FINAL DX SPEC: NORMAL
PATH REPORT.GROSS SPEC: NORMAL

## 2025-03-06 PROCEDURE — 76098 X-RAY EXAM SURGICAL SPECIMEN: CPT

## 2025-03-06 PROCEDURE — 19081 BX BREAST 1ST LESION STRTCTC: CPT | Performed by: RADIOLOGY

## 2025-03-06 PROCEDURE — A4648 IMPLANTABLE TISSUE MARKER: HCPCS | Performed by: RADIOLOGY

## 2025-03-06 PROCEDURE — C1819 TISSUE LOCALIZATION-EXCISION: HCPCS | Performed by: RADIOLOGY

## 2025-03-06 PROCEDURE — 88305 TISSUE EXAM BY PATHOLOGIST: CPT | Performed by: OBSTETRICS & GYNECOLOGY

## 2025-03-11 LAB
CYTO UR: NORMAL
DX PRELIMINARY: NORMAL
LAB AP CASE REPORT: NORMAL
LAB AP CLINICAL INFORMATION: NORMAL
LAB AP DIAGNOSIS COMMENT: NORMAL
PATH REPORT.FINAL DX SPEC: NORMAL
PATH REPORT.GROSS SPEC: NORMAL

## 2025-03-24 NOTE — PROGRESS NOTES
No chief complaint on file.    03/25/2025, Interval History  Patient recently underwent left breast stereotactic biopsy which showed atypical ductal hyperplasia.  She has appointment with Dr. Nunn later today.  Patient reports she is now ready to start chemoprevention.  She is inclined to start tamoxifen.  She denies any other concerns or complaints    HISTORY OF PRESENT ILLNESS:  The patient is a 49 y.o. year old female recently diagnosed right breast atypical ductal hyperplasia, here to discuss chemoprevention.  Oncologic history outlined below    Oncology history:  1/26/2024 bilateral diagnostic mammogram-breast extremely dense.  Finding 1 isodense oval mass obscured margins in left breast 6:00 6 cm from nipple.  Finding 2 isodense oval masses with circumscribed margin in both breast.  Left breast ultrasound-finding 1 ultrasound shows oval partially complex mixed cystic and solid lesion with partially defined margins measuring 10 x 9 x 7 mm in left breast at 6:00 6 cm from nipple.  Finding 3 oval parallel anechoic simple cyst with circumscribed margin in left breast 5 cm from nipple.  BI-RADS 4B.  2/6/2024 ultrasound-guided cyst aspiration  7/9/2024 right diagnostic mammogram-palpable lump or thickening in right breast and post cyst aspiration in left breast at 6:00.  Breast extremely dense.  Finding 1 multiple oval masses of varying size obscuring margins in right breast.  Finding 2 oval mass with obscured margin in right breast correlates with pinpointed palpable area at 1230 o'clock.  Finding 3 amorphous calcifications measuring 5 mm with grouped distribution in posterior one third of right breast at 10:00.  Bilateral breast ultrasound.  Finding 1 multiple similar masses in right breast stable and benign.  Finding 2 palpable masses in right breast benign aspiration can be performed for symptom relief.  Pending 3 calcification right breast suspicious biopsy recommended.  BI-RADS 4A.  7/22/2024 right breast  ultrasound-guided cyst aspiration-benign  7/22/2024 right breast 10:00 biopsy for calcifications-atypical ductal hyperplasia  8/15/2024 MRI bilateral breast: 1.7 cm postbiopsy hematoma at 8-9 o'clock in posterior right breast representing site of biopsy-proven atypia.  Biopsy clip located 0.8 cm from periphery of hematoma noted to be displaced on postbiopsy mammogram.  Normal MRI of left breast  9/24/2024 right breast excisional biopsy-negative for residual atypical ductal hyperplasia or flat epithelial atypia.  March 2024 left breast stereotactic biopsy-clustered cyst with atypical ductal hyperplasia with associated calcification.         MEDICATIONS    Current Outpatient Medications:     doxycycline (VIBRAMYCIN) 50 MG capsule, Take 1 capsule by mouth As Needed., Disp: , Rfl:     ALLERGIES:   No Known Allergies      I have reviewed Past Medical, Surgical History, Social History, Meds and Allergies.     REVIEW OF SYSTEMS:  See interval History           There were no vitals filed for this visit.        11/7/2024    10:33 AM   Current Status   ECOG score 0        PHYSICAL EXAM:    Physical Exam  Constitutional:       Appearance: Normal appearance.   HENT:      Head: Normocephalic and atraumatic.      Mouth/Throat:      Mouth: Mucous membranes are moist.      Pharynx: Oropharynx is clear.   Eyes:      Extraocular Movements: Extraocular movements intact.      Pupils: Pupils are equal, round, and reactive to light.   Cardiovascular:      Rate and Rhythm: Normal rate and regular rhythm.   Pulmonary:      Effort: Pulmonary effort is normal.      Breath sounds: Normal breath sounds.   Chest:       Abdominal:      General: Bowel sounds are normal.      Palpations: Abdomen is soft.   Musculoskeletal:      Cervical back: Normal range of motion and neck supple.   Neurological:      General: No focal deficit present.      Mental Status: She is alert and oriented to person, place, and time.   Psychiatric:         Mood and  Affect: Mood normal.         Behavior: Behavior normal.      I have reexamined the patient and the results are consistent with the previously documented exam. Tory Jenkins MD        RECENT LABS:        WBC   Date Value Ref Range Status   11/07/2024 4.93 3.40 - 10.80 10*3/mm3 Final   09/13/2024 7.57 3.40 - 10.80 10*3/mm3 Final   04/10/2018 4.78 4.50 - 10.70 10*3/mm3 Final     Hemoglobin   Date Value Ref Range Status   11/07/2024 13.6 12.0 - 15.9 g/dL Final   09/13/2024 13.1 12.0 - 15.9 g/dL Final   04/10/2018 13.4 11.9 - 15.5 g/dL Final     Platelets   Date Value Ref Range Status   11/07/2024 226 140 - 450 10*3/mm3 Final   09/13/2024 292 140 - 450 10*3/mm3 Final   04/10/2018 256 140 - 500 10*3/mm3 Final       Pathology:  Tissue Pathology Exam (07/22/2024 08:45)   Tissue Pathology Exam (09/24/2024 09:52)     Imaging:  MRI Breast Bilateral Diagnostic W WO Contrast (08/15/2024 10:56)     Assessment:  *Atypical ductal hyperplasia, right breast status post excisional biopsy, September 2024  *Atypical ductal hyperplasia, left breast, newly diagnosed in March 2025  *Extremely dense breast  *s/p endometrial ablation, 3+ years ago  7/9/2024 right diagnostic mammogram-palpable lump or thickening in right breast and post cyst aspiration in left breast at 6:00.  Breast extremely dense.  Finding 1 multiple oval masses of varying size obscuring margins in right breast.  Finding 2 oval mass with obscured margin in right breast correlates with pinpointed palpable area at 1230 o'clock.  Finding 3 amorphous calcifications measuring 5 mm with grouped distribution in posterior one third of right breast at 10:00.  Bilateral breast ultrasound.  Finding 1 multiple similar masses in right breast stable and benign.  Finding 2 palpable masses in right breast benign aspiration can be performed for symptom relief.  Pending 3 calcification right breast suspicious biopsy recommended.  BI-RADS 4A.  7/22/2024 right breast ultrasound-guided cyst  aspiration-benign  7/22/2024 right breast 10:00 biopsy for calcifications-atypical ductal hyperplasia  8/15/2024 MRI bilateral breast: 1.7 cm postbiopsy hematoma at 8-9 o'clock in posterior right breast representing site of biopsy-proven atypia.  Biopsy clip located 0.8 cm from periphery of hematoma noted to be displaced on postbiopsy mammogram.  Normal MRI of left breast  9/24/2024 right breast excisional biopsy-negative for residual atypical ductal hyperplasia or flat epithelial atypia.  11/7/2024: Patient established care with me.  We discussed atypical ductal hyperplasia and atypical lobular hyperplasia carries a substantial increase in the risk of subsequent breast cancer (relative risk of 3.7 to 5.3). There is an increase in the risk of both ipsilateral and contralateral breast cancer. In a report from the Nurse's Health Study, 56% of cancers that developed in women with AH occurred in the ipsilateral breast. Invasive breast cancer is 3 times more likely to occur in the ipsilateral breast after ALH is diagnosed compared to the contralateral breast. The cumulative incidence of breast cancer over 30 years approached 35%. Tamoxifen given for 5 years can reduce the risk of breast cancer by 50%. Aromatase inhibitors such as anastrozole and exemestane can reduce the risk of invasive breast cancer by50% compared to placebo in patients with atypical hyperplasia. However, we do not have any data comparing aromatase inhibitors and tamoxifen in atypical hyperplasia. We discussed the side effects of anastrozole including joint pain, hot flashes, other menopausal symptoms and loss of bone mineral density. We also discussed the side effects of tamoxifen including hot flashes, changes in the lens of the eye, increased risk of clotting (0.8%-1.7%) and increased risk of uterine cancer (4%). We also discussed that tamoxifen can increase bone density and improve cholesterol and lipids.Patient prefers to wait until she is in  menopause before she considers chemoprevention  11/8/2024: FSH 85, LH 46.8.  3/24/2025: Reviewed results of left breast stereotactic biopsy-clustered cyst with atypical ductal hyperplasia with associated calcification.  She is meeting with Dr. Nunn later today.  We re-discussed chemoprevention.  Patient is agreeable, and inclined to initiate tamoxifen.  If any surgical interventions planned, we will await final surgical pathology before proceeding with tamoxifen.  Patient voiced understanding and was in agreement.      Plan  Re-Discussed chemoprevention as noted above.  Patient inclined to start tamoxifen  If any surgical interventions planned, will await final surgical pathology before proceeding with tamoxifen.  She has appointment with Dr. Nunn later today.  Staff message to Dr. Nunn sent  Obtain repeat FSH and LH today.  Will call with results  Return to clinic to be decided.  If surgical plans, will plan to see her in 2 to 3 weeks after surgery with final pathology to finalize management.  If no surgical plans, will initiate tamoxifen and see her with CBC and CMP in 2 months for toxicity check

## 2025-03-25 ENCOUNTER — OFFICE VISIT (OUTPATIENT)
Dept: ONCOLOGY | Facility: CLINIC | Age: 50
End: 2025-03-25
Payer: COMMERCIAL

## 2025-03-25 ENCOUNTER — LAB (OUTPATIENT)
Dept: LAB | Facility: HOSPITAL | Age: 50
End: 2025-03-25
Payer: COMMERCIAL

## 2025-03-25 VITALS
HEART RATE: 63 BPM | OXYGEN SATURATION: 97 % | WEIGHT: 150.2 LBS | TEMPERATURE: 98.2 F | HEIGHT: 68 IN | DIASTOLIC BLOOD PRESSURE: 77 MMHG | BODY MASS INDEX: 22.76 KG/M2 | RESPIRATION RATE: 16 BRPM | SYSTOLIC BLOOD PRESSURE: 133 MMHG

## 2025-03-25 DIAGNOSIS — N60.91 ATYPICAL DUCTAL HYPERPLASIA OF RIGHT BREAST: ICD-10-CM

## 2025-03-25 DIAGNOSIS — Z98.890 S/P ENDOMETRIAL ABLATION: ICD-10-CM

## 2025-03-25 DIAGNOSIS — Z98.890 S/P ENDOMETRIAL ABLATION: Primary | ICD-10-CM

## 2025-03-25 LAB
BASOPHILS # BLD AUTO: 0.03 10*3/MM3 (ref 0–0.2)
BASOPHILS NFR BLD AUTO: 0.5 % (ref 0–1.5)
DEPRECATED RDW RBC AUTO: 47.4 FL (ref 37–54)
EOSINOPHIL # BLD AUTO: 0.04 10*3/MM3 (ref 0–0.4)
EOSINOPHIL NFR BLD AUTO: 0.6 % (ref 0.3–6.2)
ERYTHROCYTE [DISTWIDTH] IN BLOOD BY AUTOMATED COUNT: 13.2 % (ref 12.3–15.4)
ESTRADIOL SERPL HS-MCNC: <5 PG/ML
FSH SERPL-ACNC: 70.4 MIU/ML
HCT VFR BLD AUTO: 40.4 % (ref 34–46.6)
HGB BLD-MCNC: 13.5 G/DL (ref 12–15.9)
IMM GRANULOCYTES # BLD AUTO: 0.02 10*3/MM3 (ref 0–0.05)
IMM GRANULOCYTES NFR BLD AUTO: 0.3 % (ref 0–0.5)
LH SERPL-ACNC: 24 MIU/ML
LYMPHOCYTES # BLD AUTO: 1.87 10*3/MM3 (ref 0.7–3.1)
LYMPHOCYTES NFR BLD AUTO: 30.2 % (ref 19.6–45.3)
MCH RBC QN AUTO: 32.6 PG (ref 26.6–33)
MCHC RBC AUTO-ENTMCNC: 33.4 G/DL (ref 31.5–35.7)
MCV RBC AUTO: 97.6 FL (ref 79–97)
MONOCYTES # BLD AUTO: 0.37 10*3/MM3 (ref 0.1–0.9)
MONOCYTES NFR BLD AUTO: 6 % (ref 5–12)
NEUTROPHILS NFR BLD AUTO: 3.86 10*3/MM3 (ref 1.7–7)
NEUTROPHILS NFR BLD AUTO: 62.4 % (ref 42.7–76)
NRBC BLD AUTO-RTO: 0 /100 WBC (ref 0–0.2)
PLATELET # BLD AUTO: 242 10*3/MM3 (ref 140–450)
PMV BLD AUTO: 9.4 FL (ref 6–12)
RBC # BLD AUTO: 4.14 10*6/MM3 (ref 3.77–5.28)
WBC NRBC COR # BLD AUTO: 6.19 10*3/MM3 (ref 3.4–10.8)

## 2025-03-25 PROCEDURE — 99214 OFFICE O/P EST MOD 30 MIN: CPT | Performed by: STUDENT IN AN ORGANIZED HEALTH CARE EDUCATION/TRAINING PROGRAM

## 2025-03-25 PROCEDURE — 83002 ASSAY OF GONADOTROPIN (LH): CPT | Performed by: STUDENT IN AN ORGANIZED HEALTH CARE EDUCATION/TRAINING PROGRAM

## 2025-03-25 PROCEDURE — 36415 COLL VENOUS BLD VENIPUNCTURE: CPT

## 2025-03-25 PROCEDURE — 85025 COMPLETE CBC W/AUTO DIFF WBC: CPT

## 2025-03-25 PROCEDURE — 82670 ASSAY OF TOTAL ESTRADIOL: CPT | Performed by: STUDENT IN AN ORGANIZED HEALTH CARE EDUCATION/TRAINING PROGRAM

## 2025-03-25 PROCEDURE — 83001 ASSAY OF GONADOTROPIN (FSH): CPT | Performed by: STUDENT IN AN ORGANIZED HEALTH CARE EDUCATION/TRAINING PROGRAM

## 2025-03-26 ENCOUNTER — TELEPHONE (OUTPATIENT)
Dept: ONCOLOGY | Facility: CLINIC | Age: 50
End: 2025-03-26

## 2025-03-26 ENCOUNTER — RESULTS FOLLOW-UP (OUTPATIENT)
Dept: LAB | Facility: HOSPITAL | Age: 50
End: 2025-03-26
Payer: COMMERCIAL

## 2025-03-26 NOTE — TELEPHONE ENCOUNTER
Caller: Geri Romeo    Relationship: Self    Best call back number: [phone]    Who are you requesting to speak with (clinical staff, provider,  specific staff member): CLINICAL      What was the call regarding: PT STATES SHE MISSED A CALL FROM DR MICHEL REGARDING LAB RESULTS

## 2025-03-26 NOTE — TELEPHONE ENCOUNTER
Called patient to review her labs.  She is in postmenopausal range.  She has appointment with Dr. Nunn rescheduled for tomorrow.  She will call us after her appointment to let us know about any possible surgical plans.  We will give her follow-up appointments accordingly.      If no surgical plans, I would start patient on tamoxifen (patient prefers tamoxifen over Arimidex) and plan to see her with CBC and CMP in about 2 to 3 months for toxicity check    If surgery is planned, I would like to see her in 2 to 3 weeks after surgery to review final pathology and finalize management.    Patient verbalized understanding and was in agreement

## 2025-03-27 ENCOUNTER — OFFICE VISIT (OUTPATIENT)
Dept: SURGERY | Facility: CLINIC | Age: 50
End: 2025-03-27
Payer: COMMERCIAL

## 2025-03-27 ENCOUNTER — PREP FOR SURGERY (OUTPATIENT)
Dept: OTHER | Facility: HOSPITAL | Age: 50
End: 2025-03-27

## 2025-03-27 VITALS
HEART RATE: 67 BPM | OXYGEN SATURATION: 100 % | DIASTOLIC BLOOD PRESSURE: 86 MMHG | SYSTOLIC BLOOD PRESSURE: 126 MMHG | BODY MASS INDEX: 22.25 KG/M2 | WEIGHT: 146.8 LBS | HEIGHT: 68 IN

## 2025-03-27 DIAGNOSIS — N60.92 ATYPICAL DUCTAL HYPERPLASIA OF LEFT BREAST: Primary | ICD-10-CM

## 2025-03-27 NOTE — PROGRESS NOTES
General History:  Geri Romeo is a 49 y.o. female with the following history: Mike met with her in August 2024 to discuss incidentally found atypical ductal hyperplasia.  She has a history of multiple cysts in bilateral breast and on short interval follow-up for the cyst was noted to have new calcifications.  Calcifications were noted to be atypical hyperplasia for which she underwent surgical excision, final pathology was noted to be benign breast tissue.  Prior to surgery she underwent bilateral breast MRI which showed no additional areas of concern outside of biopsied area of atypical ductal hyperplasia.      Interval History: Since that time she is been in her usual state of health.  Notes no new breast complaints since our last visit.  She met with medical oncology and was planning to initiate hormonal blockade for chemoprevention.  She was completing her routine annual mammogram when a new area of calcifications in the left breast were noted and biopsy returned as a new focus of atypical ductal hyperplasia.  She denies any changes in her breast, no new masses, skin changes or nipple discharge.  No changes to her personal or family history.    Past Medical History:   Diagnosis Date    Atypical ductal hyperplasia of right breast     PONV (postoperative nausea and vomiting)     Rosacea      Patient Active Problem List   Diagnosis    Acne vulgaris    Cystic breast, unspecified laterality    Solitary cyst of right breast    Atypical ductal hyperplasia of right breast    Rosacea    Encounter for screening colonoscopy    Family history of colonic polyps     Current Outpatient Medications on File Prior to Visit   Medication Sig Dispense Refill    doxycycline (VIBRAMYCIN) 50 MG capsule Take 1 capsule by mouth As Needed.       No current facility-administered medications on file prior to visit.     No Known Allergies    Past Surgical History:   Procedure Laterality Date    APPENDECTOMY  1984    BREAST  LUMPECTOMY Right 09/24/2024    Procedure: right breast partial mastectomy, analilia guided;  Surgeon: Coleen Nunn MD;  Location:  [facility];  Service: General;  Laterality: Right;    COLONOSCOPY W/ POLYPECTOMY N/A 3/5/2025    Procedure: COLONOSCOPY WITH POLYPECTOMY;  Surgeon: Juan Miguel Castañeda MD;  Location:  [facility];  Service: Gastroenterology;  Laterality: N/A;  sigmoid polyp    CYST REMOVAL  2015    SCALP    ENDOMETRIAL ABLATION  2022    LACERATION REPAIR Right 06/2024    FOOT     Social History     Socioeconomic History    Marital status: Unknown   Tobacco Use    Smoking status: Never     Passive exposure: Never    Smokeless tobacco: Never   Vaping Use    Vaping status: Never Used   Substance and Sexual Activity    Alcohol use: Yes     Alcohol/week: 2.0 standard drinks of alcohol     Types: 2 Glasses of wine per week     Comment: OCCASIONAL    Drug use: No    Sexual activity: Yes     Partners: Male     Birth control/protection: Vasectomy     Comment:  has had a vascectomy     Family History   Problem Relation Age of Onset    No Known Problems Mother     No Known Problems Father     No Known Problems Brother         (twin)    Leukemia Brother         CLL    Parkinsonism Maternal Grandmother     Coronary artery disease Maternal Grandfather     Stroke Maternal Grandfather     No Known Problems Paternal Grandmother     Glaucoma Paternal Grandfather     Malig Hyperthermia Neg Hx         Review of Systems:  CONSTITUTIONAL: No weight loss, fever, chills, weakness or fatigue.  HEENT: Eyes: No visual loss, blurred vision, double vision or yellow sclerae. Ears, Nose, Throat: No hearing loss, sneezing, congestion, runny nose or sore throat.  SKIN: No rash or itching.  CARDIOVASCULAR: No chest pain, chest pressure or chest discomfort. No palpitations or edema.  RESPIRATORY: No shortness of breath, cough or sputum.  GASTROINTESTINAL: No anorexia, nausea, vomiting or diarrhea. No abdominal pain or  blood.  GENITOURINARY: No burning on urination  NEUROLOGICAL: No headache, dizziness, syncope, paralysis, ataxia, numbness or tingling in the extremities. No change in bowel or bladder control.  MUSCULOSKELETAL: No muscle, back pain, joint pain or stiffness.  HEMATOLOGIC: No anemia, bleeding or bruising.  LYMPHATICS: No enlarged nodes.  PSYCHIATRIC: No history of depression or anxiety.  ENDOCRINOLOGIC: No reports of sweating, cold or heat intolerance. No polyuria or polydipsia.  ALLERGIES: No history of asthma, hives, eczema or rhinitis.    Physical Exam:  Vitals:    25 1204   BP: 126/86   Pulse: 67   SpO2: 100%         General: Alert, cooperative    HEENT: Atraumatic, normocephalic    Oral/Maxillofacial: Moist mucous membranes    Neck: Supple     Lungs: EWOB, clear to auscultation bilaterally     Heart: RRR    Breast: Bilateral breasts examined sitting and supine.  RIGHT-well-healed radial incision to upper outer quadrant, no masses outside of ongoing resolution of her surgical bed in the upper outer quadrant with denser ridges of breast tissue, skin changes, or nipple discharge  LEFT- no masses, skin changes, or nipple discharge    Lymphatics:  Bilateral supraclavicular, cervical, and axillary basins without lymphadneopathy    Abdomen: Soft, non-tender, non-distended    Extremities: normal strength and sensation.  No obvious deformities.     Neuro: alert, normal speech, no focal findings or movement disorder noted     Skin: no lesions or abrasions      Recent Imagin/15/2024 Preop Breast MRI  FINDINGS: There is heterogeneous fibroglandular tissue. There is marked  background parenchymal enhancement, right greater than left, which  limits the sensitivity for detection of invasive malignancy and DCIS.   There are numerous bilateral breast cysts, some of which demonstrate  intrinsic T1 hyperintense signal consistent with complicated  proteinaceous/hemorrhagic cysts. Additionally, there is intrinsic  T1  hyperintense signal within multiple ducts in both breasts, right greater  than left, consistent with proteinaceous and/or hemorrhagic contents.     RIGHT BREAST:    At 8-9 o'clock in the posterior right breast, 10 cm posterior to the  nipple, there is a 1.5 cm AP dimension, 1.7 cm transverse dimension, 1.5  cm craniocaudal dimension T1 hyperintense postbiopsy hematoma  representing the site of biopsy-proven atypia. A focus of susceptibility  from a biopsy clip (TOPHAT) is located at 9:00 in the posterior right  breast, 9.4 cm posterior to the nipple, approximately 0.8 cm anterior  and superior to the superior aspect of the postbiopsy hematoma. The clip  was previously noted to be displaced from the biopsy site on the  postbiopsy mammogram. No suspicious enhancing mass or area of non-mass  enhancement is identified at the biopsy site or elsewhere within the  right breast.  The visualized axilla is within normal limits.     LEFT BREAST:    No suspicious enhancing mass or area of non-mass enhancement is  identified.  The visualized axilla is within normal limits.     EXTRAMAMMARY FINDINGS:  There are no pathologically enlarged internal mammary chain lymph nodes  on either side.    There are incompletely assessed T2 hyperintense hepatic lesions.      IMPRESSION AND RECOMMENDATION:   1.  A 1.7 cm post biopsy hematoma at 8-9 o'clock in the posterior right  breast represents the site of biopsy-proven atypia. The biopsy clip is  located approximately 0.8 cm from the periphery of the hematoma and was  noted to be displaced on postbiopsy mammogram. Recommend surgical  management with preoperative localization targeting the biopsy site.  2.  No MRI evidence of malignancy in the left breast.  BI-RADS Category 4    1/31/2025 bilateral screening mammogram ([facility])  Breasts are extremely dense which lowers the sensitivity of mammography.  Finding 1: Multiple similar oval masses seen in both breast.  No new suspicious masses,  calcifications or area of architectural distortion.  Finding 2: Postsurgical scar in the upper outer region of the right breast.  Area of prior excisional biopsy.  Finding 3: Round rim calcifications with diffuse distribution seen in both breast.  Finding 4: Calcifications in the posterior one third upper inner region of the left breast  Impression:  Findings 1 2 and 3 are benign  Finding 4 calcifications in the left breast require additional evaluation.  BI-RADS 0    2/18/2025 left diagnostic mammogram ([facility])  Additional evaluation for calcifications of the left breast seen on 1/31/2025, on present there are calcifications measuring 5 mm with grouped distribution in the upper inner region of the left breast.  Impression: Calcifications in the left breast are suspicious biopsy is recommended  BI-RADS 4A    3/6/2025 stereotactic biopsy  9 gauge vacuum-assisted device 8 specimens were obtained.  Specimen radiograph shows calcifications within the sample.  A Top-Hat shaped biopsy clip was deployed within the biopsy bed.  Pathology shows atypical ductal hyperplasia, is concordant with imaging.    Pathology:  3/6/25  1.  Left breast, upper inner quadrant, stereotactic biopsies for calcifications (Top-Hat):               -Clustered cysts with atypical ductal hyperplasia (ADH) with associated calcifications.    Assessment/Plan: Geri Romeo is a 49 y.o.female with h/o right atypical ductal hyperplasia.  Now with a new focus of left atypical ductal hyperplasia  -Consents completed and signed. Discussed the risks and benefits of left partial mastectomy, LUZ ELENA guided at length including estimated time for procedure, postoperative recovery, and postoperative instructions. Discussed the risks to include pain, bleeding, infection, nerve injury, numbness, seroma, skin complications including necrosis, breast asymmetry, need for re-excision and scar.  Patient appears to understand and wishes to proceed.  All questions were  answered.  - Follow-up with Dr. Jenkins following surgery to discuss final pathology and recommendations for chemoprevention  - Continue annual MRIs in August.  - Continue screening mammograms in January.  -Pending final pathology will follow-up with high risk nurse practitioners after MRI in August for ongoing imaging follow-up and clinical exams.  Will plan to see on an annual basis as long as she follows with medical oncology.      Time-Based Care: The total time today, spent on care for this patient, was 30 minutes which included preparing to see the patient, obtaining and/or reviewing a separately obtained history, performing a medically necessary exam and evaluation, counseling the patient/family/caregiver, ordering medications, tests and/or procedures, documenting clinical information in the medical record, independently interpreting results, communicating results to the patient/family/caregiver, care coordination and referring and communicating with others. This time was independent and non-overlapping.    Coleen Nunn MD  Breast Surgical Oncology

## 2025-03-27 NOTE — LETTER
March 27, 2025     Gisele Britton MD  [address] [facility]  [address]  [facility] [address]    Patient: Geri Romeo   YOB: 1975   Date of Visit: 3/27/2025     Dear Gisele Britton MD:       Thank you for referring Geri Romeo to me for evaluation. Below are the relevant portions of my assessment and plan of care.    If you have questions, please do not hesitate to call me. I look forward to following Geri along with you.         Sincerely,        Coleen Nunn MD        CC: TARAN Serna MD Couch, Sarah, MD  03/27/25 1400  Sign when Signing Visit    General History:  Geri Romeo is a 49 y.o. female with the following history: Shevlin met with her in August 2024 to discuss incidentally found atypical ductal hyperplasia.  She has a history of multiple cysts in bilateral breast and on short interval follow-up for the cyst was noted to have new calcifications.  Calcifications were noted to be atypical hyperplasia for which she underwent surgical excision, final pathology was noted to be benign breast tissue.  Prior to surgery she underwent bilateral breast MRI which showed no additional areas of concern outside of biopsied area of atypical ductal hyperplasia.      Interval History: Since that time she is been in her usual state of health.  Notes no new breast complaints since our last visit.  She met with medical oncology and was planning to initiate hormonal blockade for chemoprevention.  She was completing her routine annual mammogram when a new area of calcifications in the left breast were noted and biopsy returned as a new focus of atypical ductal hyperplasia.  She denies any changes in her breast, no new masses, skin changes or nipple discharge.  No changes to her personal or family history.    Past Medical History:   Diagnosis Date   • Atypical ductal hyperplasia of right breast    • PONV (postoperative nausea and vomiting)    • Rosacea      Patient Active Problem  List   Diagnosis   • Acne vulgaris   • Cystic breast, unspecified laterality   • Solitary cyst of right breast   • Atypical ductal hyperplasia of right breast   • Rosacea   • Encounter for screening colonoscopy   • Family history of colonic polyps     Current Outpatient Medications on File Prior to Visit   Medication Sig Dispense Refill   • doxycycline (VIBRAMYCIN) 50 MG capsule Take 1 capsule by mouth As Needed.       No current facility-administered medications on file prior to visit.     No Known Allergies    Past Surgical History:   Procedure Laterality Date   • APPENDECTOMY  1984   • BREAST LUMPECTOMY Right 09/24/2024    Procedure: right breast partial mastectomy, analilia guided;  Surgeon: oCleen Nunn MD;  Location: [facility] MAIN OR;  Service: General;  Laterality: Right;   • COLONOSCOPY W/ POLYPECTOMY N/A 3/5/2025    Procedure: COLONOSCOPY WITH POLYPECTOMY;  Surgeon: Juan Miguel Castañeda MD;  Location: [facility] OR;  Service: Gastroenterology;  Laterality: N/A;  sigmoid polyp   • CYST REMOVAL  2015    SCALP   • ENDOMETRIAL ABLATION  2022   • LACERATION REPAIR Right 06/2024    FOOT     Social History     Socioeconomic History   • Marital status: Unknown   Tobacco Use   • Smoking status: Never     Passive exposure: Never   • Smokeless tobacco: Never   Vaping Use   • Vaping status: Never Used   Substance and Sexual Activity   • Alcohol use: Yes     Alcohol/week: 2.0 standard drinks of alcohol     Types: 2 Glasses of wine per week     Comment: OCCASIONAL   • Drug use: No   • Sexual activity: Yes     Partners: Male     Birth control/protection: Vasectomy     Comment:  has had a vascectomy     Family History   Problem Relation Age of Onset   • No Known Problems Mother    • No Known Problems Father    • No Known Problems Brother         (twin)   • Leukemia Brother         CLL   • Parkinsonism Maternal Grandmother    • Coronary artery disease Maternal Grandfather    • Stroke Maternal Grandfather    • No Known Problems  Paternal Grandmother    • Glaucoma Paternal Grandfather    • Malig Hyperthermia Neg Hx         Review of Systems:  CONSTITUTIONAL: No weight loss, fever, chills, weakness or fatigue.  HEENT: Eyes: No visual loss, blurred vision, double vision or yellow sclerae. Ears, Nose, Throat: No hearing loss, sneezing, congestion, runny nose or sore throat.  SKIN: No rash or itching.  CARDIOVASCULAR: No chest pain, chest pressure or chest discomfort. No palpitations or edema.  RESPIRATORY: No shortness of breath, cough or sputum.  GASTROINTESTINAL: No anorexia, nausea, vomiting or diarrhea. No abdominal pain or blood.  GENITOURINARY: No burning on urination  NEUROLOGICAL: No headache, dizziness, syncope, paralysis, ataxia, numbness or tingling in the extremities. No change in bowel or bladder control.  MUSCULOSKELETAL: No muscle, back pain, joint pain or stiffness.  HEMATOLOGIC: No anemia, bleeding or bruising.  LYMPHATICS: No enlarged nodes.  PSYCHIATRIC: No history of depression or anxiety.  ENDOCRINOLOGIC: No reports of sweating, cold or heat intolerance. No polyuria or polydipsia.  ALLERGIES: No history of asthma, hives, eczema or rhinitis.    Physical Exam:  Vitals:    03/27/25 1204   BP: 126/86   Pulse: 67   SpO2: 100%         General: Alert, cooperative    HEENT: Atraumatic, normocephalic    Oral/Maxillofacial: Moist mucous membranes    Neck: Supple     Lungs: EWOB, clear to auscultation bilaterally     Heart: RRR    Breast: Bilateral breasts examined sitting and supine.  RIGHT-well-healed radial incision to upper outer quadrant, no masses outside of ongoing resolution of her surgical bed in the upper outer quadrant with denser ridges of breast tissue, skin changes, or nipple discharge  LEFT- no masses, skin changes, or nipple discharge    Lymphatics:  Bilateral supraclavicular, cervical, and axillary basins without lymphadneopathy    Abdomen: Soft, non-tender, non-distended    Extremities: normal strength and sensation.   No obvious deformities.     Neuro: alert, normal speech, no focal findings or movement disorder noted     Skin: no lesions or abrasions      Recent Imagin/15/2024 Preop Breast MRI  FINDINGS: There is heterogeneous fibroglandular tissue. There is marked  background parenchymal enhancement, right greater than left, which  limits the sensitivity for detection of invasive malignancy and DCIS.   There are numerous bilateral breast cysts, some of which demonstrate  intrinsic T1 hyperintense signal consistent with complicated  proteinaceous/hemorrhagic cysts. Additionally, there is intrinsic T1  hyperintense signal within multiple ducts in both breasts, right greater  than left, consistent with proteinaceous and/or hemorrhagic contents.     RIGHT BREAST:    At 8-9 o'clock in the posterior right breast, 10 cm posterior to the  nipple, there is a 1.5 cm AP dimension, 1.7 cm transverse dimension, 1.5  cm craniocaudal dimension T1 hyperintense postbiopsy hematoma  representing the site of biopsy-proven atypia. A focus of susceptibility  from a biopsy clip (TOPHAT) is located at 9:00 in the posterior right  breast, 9.4 cm posterior to the nipple, approximately 0.8 cm anterior  and superior to the superior aspect of the postbiopsy hematoma. The clip  was previously noted to be displaced from the biopsy site on the  postbiopsy mammogram. No suspicious enhancing mass or area of non-mass  enhancement is identified at the biopsy site or elsewhere within the  right breast.  The visualized axilla is within normal limits.     LEFT BREAST:    No suspicious enhancing mass or area of non-mass enhancement is  identified.  The visualized axilla is within normal limits.     EXTRAMAMMARY FINDINGS:  There are no pathologically enlarged internal mammary chain lymph nodes  on either side.    There are incompletely assessed T2 hyperintense hepatic lesions.      IMPRESSION AND RECOMMENDATION:   1.  A 1.7 cm post biopsy hematoma at 8-9  o'clock in the posterior right  breast represents the site of biopsy-proven atypia. The biopsy clip is  located approximately 0.8 cm from the periphery of the hematoma and was  noted to be displaced on postbiopsy mammogram. Recommend surgical  management with preoperative localization targeting the biopsy site.  2.  No MRI evidence of malignancy in the left breast.  BI-RADS Category 4    1/31/2025 bilateral screening mammogram ([facility])  Breasts are extremely dense which lowers the sensitivity of mammography.  Finding 1: Multiple similar oval masses seen in both breast.  No new suspicious masses, calcifications or area of architectural distortion.  Finding 2: Postsurgical scar in the upper outer region of the right breast.  Area of prior excisional biopsy.  Finding 3: Round rim calcifications with diffuse distribution seen in both breast.  Finding 4: Calcifications in the posterior one third upper inner region of the left breast  Impression:  Findings 1 2 and 3 are benign  Finding 4 calcifications in the left breast require additional evaluation.  BI-RADS 0    2/18/2025 left diagnostic mammogram ([facility])  Additional evaluation for calcifications of the left breast seen on 1/31/2025, on present there are calcifications measuring 5 mm with grouped distribution in the upper inner region of the left breast.  Impression: Calcifications in the left breast are suspicious biopsy is recommended  BI-RADS 4A    3/6/2025 stereotactic biopsy  9 gauge vacuum-assisted device 8 specimens were obtained.  Specimen radiograph shows calcifications within the sample.  A Top-Hat shaped biopsy clip was deployed within the biopsy bed.  Pathology shows atypical ductal hyperplasia, is concordant with imaging.    Pathology:  3/6/25  1.  Left breast, upper inner quadrant, stereotactic biopsies for calcifications (Top-Hat):               -Clustered cysts with atypical ductal hyperplasia (ADH) with associated calcifications.    Assessment/Plan: Geri ERWIN  Agueda is a 49 y.o.female with h/o right atypical ductal hyperplasia.  Now with a new focus of left atypical ductal hyperplasia  -Consents completed and signed. Discussed the risks and benefits of left partial mastectomy, LUZ ELENA guided at length including estimated time for procedure, postoperative recovery, and postoperative instructions. Discussed the risks to include pain, bleeding, infection, nerve injury, numbness, seroma, skin complications including necrosis, breast asymmetry, need for re-excision and scar.  Patient appears to understand and wishes to proceed.  All questions were answered.  - Follow-up with Dr. Jenkins following surgery to discuss final pathology and recommendations for chemoprevention  - Continue annual MRIs in August.  - Continue screening mammograms in January.  -Pending final pathology will follow-up with high risk nurse practitioners after MRI in August for ongoing imaging follow-up and clinical exams.  Will plan to see on an annual basis as long as she follows with medical oncology.      Time-Based Care: The total time today, spent on care for this patient, was 30 minutes which included preparing to see the patient, obtaining and/or reviewing a separately obtained history, performing a medically necessary exam and evaluation, counseling the patient/family/caregiver, ordering medications, tests and/or procedures, documenting clinical information in the medical record, independently interpreting results, communicating results to the patient/family/caregiver, care coordination and referring and communicating with others. This time was independent and non-overlapping.    Coleen Nunn MD  Breast Surgical Oncology

## 2025-03-28 ENCOUNTER — TELEPHONE (OUTPATIENT)
Dept: SURGERY | Facility: CLINIC | Age: 50
End: 2025-03-28
Payer: COMMERCIAL

## 2025-03-28 NOTE — TELEPHONE ENCOUNTER
Left Message- Surgery 5/9 arrive at 10 surgery at 12 Pre Admission 5/1 at 8 Post Op 5/23 at 11 and Kas[facility] at [facility] 4/8 at 10:30.

## 2025-04-01 ENCOUNTER — TELEPHONE (OUTPATIENT)
Dept: SURGERY | Facility: CLINIC | Age: 50
End: 2025-04-01
Payer: COMMERCIAL

## 2025-04-01 NOTE — TELEPHONE ENCOUNTER
Informed patient that Dr. Nunn was not doing surgeries 5/9 moved surgery to 5/16 at patient request. Pre Admission and arrival time stay the same. Just new surgery date.     Patient confirmed Understanding   EAS

## 2025-04-08 ENCOUNTER — APPOINTMENT (OUTPATIENT)
Dept: WOMENS IMAGING | Facility: HOSPITAL | Age: 50
End: 2025-04-08
Payer: COMMERCIAL

## 2025-04-08 PROCEDURE — A4648 IMPLANTABLE TISSUE MARKER: HCPCS | Performed by: RADIOLOGY

## 2025-04-08 PROCEDURE — 19285 PERQ DEV BREAST 1ST US IMAG: CPT | Performed by: RADIOLOGY

## 2025-04-08 PROCEDURE — C1728 CATH, BRACHYTX SEED ADM: HCPCS | Performed by: RADIOLOGY

## 2025-05-01 ENCOUNTER — PRE-ADMISSION TESTING (OUTPATIENT)
Dept: PREADMISSION TESTING | Facility: HOSPITAL | Age: 50
End: 2025-05-01
Payer: COMMERCIAL

## 2025-05-01 VITALS
HEART RATE: 62 BPM | BODY MASS INDEX: 22.55 KG/M2 | SYSTOLIC BLOOD PRESSURE: 115 MMHG | WEIGHT: 148.8 LBS | OXYGEN SATURATION: 99 % | HEIGHT: 68 IN | TEMPERATURE: 98.2 F | RESPIRATION RATE: 16 BRPM | DIASTOLIC BLOOD PRESSURE: 74 MMHG

## 2025-05-01 LAB
ANION GAP SERPL CALCULATED.3IONS-SCNC: 10 MMOL/L (ref 5–15)
BASOPHILS # BLD AUTO: 0.02 10*3/MM3 (ref 0–0.2)
BASOPHILS NFR BLD AUTO: 0.4 % (ref 0–1.5)
BUN SERPL-MCNC: 14 MG/DL (ref 6–20)
BUN/CREAT SERPL: 20.6 (ref 7–25)
CALCIUM SPEC-SCNC: 9.2 MG/DL (ref 8.6–10.5)
CHLORIDE SERPL-SCNC: 103 MMOL/L (ref 98–107)
CO2 SERPL-SCNC: 26 MMOL/L (ref 22–29)
CREAT SERPL-MCNC: 0.68 MG/DL (ref 0.57–1)
DEPRECATED RDW RBC AUTO: 44.2 FL (ref 37–54)
EGFRCR SERPLBLD CKD-EPI 2021: 106.9 ML/MIN/1.73
EOSINOPHIL # BLD AUTO: 0.03 10*3/MM3 (ref 0–0.4)
EOSINOPHIL NFR BLD AUTO: 0.7 % (ref 0.3–6.2)
ERYTHROCYTE [DISTWIDTH] IN BLOOD BY AUTOMATED COUNT: 12.5 % (ref 12.3–15.4)
GLUCOSE SERPL-MCNC: 91 MG/DL (ref 65–99)
HCT VFR BLD AUTO: 37.8 % (ref 34–46.6)
HGB BLD-MCNC: 12.5 G/DL (ref 12–15.9)
IMM GRANULOCYTES # BLD AUTO: 0.03 10*3/MM3 (ref 0–0.05)
IMM GRANULOCYTES NFR BLD AUTO: 0.7 % (ref 0–0.5)
LYMPHOCYTES # BLD AUTO: 1.56 10*3/MM3 (ref 0.7–3.1)
LYMPHOCYTES NFR BLD AUTO: 34.3 % (ref 19.6–45.3)
MCH RBC QN AUTO: 32.2 PG (ref 26.6–33)
MCHC RBC AUTO-ENTMCNC: 33.1 G/DL (ref 31.5–35.7)
MCV RBC AUTO: 97.4 FL (ref 79–97)
MONOCYTES # BLD AUTO: 0.32 10*3/MM3 (ref 0.1–0.9)
MONOCYTES NFR BLD AUTO: 7 % (ref 5–12)
NEUTROPHILS NFR BLD AUTO: 2.59 10*3/MM3 (ref 1.7–7)
NEUTROPHILS NFR BLD AUTO: 56.9 % (ref 42.7–76)
NRBC BLD AUTO-RTO: 0 /100 WBC (ref 0–0.2)
PLATELET # BLD AUTO: 275 10*3/MM3 (ref 140–450)
PMV BLD AUTO: 9.4 FL (ref 6–12)
POTASSIUM SERPL-SCNC: 4.7 MMOL/L (ref 3.5–5.2)
RBC # BLD AUTO: 3.88 10*6/MM3 (ref 3.77–5.28)
SODIUM SERPL-SCNC: 139 MMOL/L (ref 136–145)
WBC NRBC COR # BLD AUTO: 4.55 10*3/MM3 (ref 3.4–10.8)

## 2025-05-01 PROCEDURE — 85025 COMPLETE CBC W/AUTO DIFF WBC: CPT | Performed by: STUDENT IN AN ORGANIZED HEALTH CARE EDUCATION/TRAINING PROGRAM

## 2025-05-01 PROCEDURE — 80048 BASIC METABOLIC PNL TOTAL CA: CPT | Performed by: STUDENT IN AN ORGANIZED HEALTH CARE EDUCATION/TRAINING PROGRAM

## 2025-05-01 NOTE — DISCHARGE INSTRUCTIONS
Take the following medications the morning of surgery:  NONE    If you are on prescription narcotic pain medication to control your pain you may also take that medication the morning of surgery.      General Instructions:     Do not eat solid food after midnight the night before surgery.  Clear liquids day of surgery are allowed but must be stopped at least two hours before your hospital arrival time.       Allowed clear liquids      Water, sodas, and tea or coffee with no cream or milk added.       12 to 20 ounces of a clear liquid that contains carbohydrates is recommended.  If non-diabetic, have Gatorade or Powerade.  If diabetic, have G2 or Powerade Zero.     Do not have liquids red in color.  Do not consume chicken, beef, pork or vegetable broth or bouillon cubes of any variety as they are not considered clear liquids and are not allowed.      Infants may have breast milk up to four hours before surgery.  Infants drinking formula may drink formula up to six hours before surgery.   Patients who avoid smoking, chewing tobacco and alcohol for 4 weeks prior to surgery have a reduced risk of post-operative complications.  Quit smoking as many days before surgery as you can.  Do not smoke, use chewing tobacco or drink alcohol the day of surgery.   If applicable bring your C-PAP/ BI-PAP machine in with you to preop day of surgery.  Bring any papers given to you in the doctor’s office.  Wear clean comfortable clothes.  Do not wear contact lenses, false eyelashes or make-up.  Bring a case for your glasses.   Bring crutches or walker if applicable.  Remove all piercings.  Leave jewelry and any other valuables at home.  Hair extensions with metal clips must be removed prior to surgery.  The Pre-Admission Testing nurse will instruct you to bring medications if unable to obtain an accurate list in Pre-Admission Testing.    Day of surgery you will need to let the preoperative nurse know the last time you took each of your  medications.  To ensure a safe environment for patients and staff, we kindly ask that children under the age of 16 not accompany patients.  If you must bring a dependent child or dependent adult please ensure a responsible adult, other than yourself, is present to supervise them.          Preventing a Surgical Site Infection:  For 2 to 3 days before surgery, avoid shaving with a razor because the razor can irritate skin and make it easier to develop an infection.    Any areas of open skin can increase the risk of a post-operative wound infection by allowing bacteria to enter and travel throughout the body.  Notify your surgeon if you have any skin wounds / rashes even if it is not near the expected surgical site.  The area will need assessed to determine if surgery should be delayed until it is healed.  The night prior to surgery shower using a fresh bar of anti-bacterial soap (such as Dial) and clean washcloth.  Sleep in a clean bed with clean clothing.  Do not allow pets to sleep with you.  Shower on the morning of surgery using a fresh bar of anti-bacterial soap (such as Dial) and clean washcloth.  Dry with a clean towel and dress in clean clothing.  Ask your surgeon if you will be receiving antibiotics prior to surgery.  Make sure you, your family, and all healthcare providers clean their hands with soap and water or an alcohol based hand  before caring for you or your wound.    Day of surgery:  Your arrival time is approximately two hours before your scheduled surgery time.  Please note if you have an early arrival time the surgery doors do not open before 5:00 AM.  Upon arrival, a Pre-op nurse and Anesthesiologist will review your health history, obtain vital signs, and answer questions you may have.  The only belongings needed at this time will be a list of your home medications and if applicable your C-PAP/BI-PAP machine.  A Pre-op nurse will start an IV and you may receive medication in preparation  for surgery, including something to help you relax.     Please be aware that surgery does come with discomfort.  We want to make every effort to control your discomfort so please discuss any uncontrolled symptoms with your nurse.   Your doctor will most likely have prescribed pain medications.      If you are going home after surgery you will receive individualized written care instructions before being discharged.  A responsible adult must drive you to and from the hospital on the day of your surgery and ideally stay with you through the night.   .  Discharge prescriptions can be filled by the hospital pharmacy during regular pharmacy hours.  If you are having surgery late in the day/evening your prescription may be e-prescribed to your pharmacy.  Please verify your pharmacy hours or chose a 24 hour pharmacy to avoid not having access to your prescription because your pharmacy has closed for the day.    If you are staying overnight following surgery, you will be transported to your hospital room following the recovery period.  [facility] has all private rooms.    If you have any questions please call Pre-Admission Testing at [phone].  Deductibles and co-payments are collected on the day of service. Please be prepared to pay the required co-pay, deductible or deposit on the day of service as defined by your plan.    Call your surgeon immediately if you experience any of the following symptoms:  Sore Throat  Shortness of Breath or difficulty breathing  Cough  Chills  Body soreness or muscle pain  Headache  Fever  New loss of taste or smell  Do not arrive for your surgery ill.  Your procedure will need to be rescheduled to another time.  You will need to call your physician before the day of surgery to avoid any unnecessary exposure to hospital staff as well as other patients.

## 2025-05-05 ENCOUNTER — OFFICE VISIT (OUTPATIENT)
Dept: INTERNAL MEDICINE | Facility: CLINIC | Age: 50
End: 2025-05-05
Payer: COMMERCIAL

## 2025-05-05 VITALS
OXYGEN SATURATION: 96 % | WEIGHT: 151.6 LBS | TEMPERATURE: 98 F | SYSTOLIC BLOOD PRESSURE: 102 MMHG | BODY MASS INDEX: 23.05 KG/M2 | DIASTOLIC BLOOD PRESSURE: 70 MMHG | HEART RATE: 70 BPM

## 2025-05-05 DIAGNOSIS — J06.9 ACUTE URI: Primary | ICD-10-CM

## 2025-05-05 PROCEDURE — 99213 OFFICE O/P EST LOW 20 MIN: CPT | Performed by: INTERNAL MEDICINE

## 2025-05-05 NOTE — PROGRESS NOTES
Geri Romeo is a 49 y.o. female, who presents with a chief complaint of   Chief Complaint   Patient presents with    Sore Throat     Started yesterday morning     Nasal Congestion           HPI   Pt here bc of sore throat and subjective fever that began last night.  She has some PND but no cough.  No known sick contacts.  She is scheduled for surgery on 5/16 so she doesn't want to be sick.      The following portions of the patient's history were reviewed and updated as appropriate: allergies, current medications, past family history, past medical history, past social history, past surgical history and problem list.    Allergies: Patient has no known allergies.    Review of Systems   Constitutional: Negative.    HENT:  Positive for postnasal drip and sore throat.    Eyes: Negative.    Respiratory: Negative.     Cardiovascular: Negative.    Gastrointestinal: Negative.    Endocrine: Negative.    Genitourinary: Negative.    Musculoskeletal: Negative.    Skin: Negative.    Allergic/Immunologic: Negative.    Neurological: Negative.    Hematological: Negative.    Psychiatric/Behavioral: Negative.     All other systems reviewed and are negative.            Wt Readings from Last 3 Encounters:   05/05/25 68.8 kg (151 lb 9.6 oz)   05/01/25 67.5 kg (148 lb 12.8 oz)   03/27/25 66.6 kg (146 lb 12.8 oz)     Temp Readings from Last 3 Encounters:   05/05/25 98 °F (36.7 °C) (Infrared)   05/01/25 98.2 °F (36.8 °C) (Oral)   03/25/25 98.2 °F (36.8 °C) (Oral)     BP Readings from Last 3 Encounters:   05/05/25 102/70   05/01/25 115/74   03/27/25 126/86     Pulse Readings from Last 3 Encounters:   05/05/25 70   05/01/25 62   03/27/25 67     Body mass index is 23.05 kg/m².  SpO2 Readings from Last 3 Encounters:   05/05/25 96%   05/01/25 99%   03/27/25 100%          Physical Exam  Vitals and nursing note reviewed.   Constitutional:       Appearance: She is well-developed.   HENT:      Head: Normocephalic and atraumatic.      Right  Ear: External ear normal.      Left Ear: External ear normal.      Mouth/Throat:      Pharynx: Oropharyngeal exudate and posterior oropharyngeal erythema present.   Eyes:      General: Lids are normal.      Conjunctiva/sclera: Conjunctivae normal.   Cardiovascular:      Rate and Rhythm: Normal rate and regular rhythm.   Pulmonary:      Effort: Pulmonary effort is normal. No respiratory distress.      Breath sounds: Normal breath sounds. No wheezing.   Musculoskeletal:         General: Normal range of motion.      Cervical back: Normal range of motion and neck supple.   Lymphadenopathy:      Cervical: No cervical adenopathy.   Skin:     General: Skin is warm and dry.      Findings: No rash.   Neurological:      Mental Status: She is alert and oriented to person, place, and time.      Cranial Nerves: No cranial nerve deficit.   Psychiatric:         Behavior: Behavior normal.         Thought Content: Thought content normal.         Results for orders placed or performed in visit on 03/25/25   Estradiol    Collection Time: 03/25/25 12:52 PM    Specimen: Blood   Result Value Ref Range    Estradiol <5.0 pg/mL   FSH & LH    Collection Time: 03/25/25 12:52 PM    Specimen: Blood   Result Value Ref Range    FSH 70.40 mIU/mL    LH 24.00 mIU/mL   CBC Auto Differential    Collection Time: 03/25/25 12:52 PM    Specimen: Blood   Result Value Ref Range    WBC 6.19 3.40 - 10.80 10*3/mm3    RBC 4.14 3.77 - 5.28 10*6/mm3    Hemoglobin 13.5 12.0 - 15.9 g/dL    Hematocrit 40.4 34.0 - 46.6 %    MCV 97.6 (H) 79.0 - 97.0 fL    MCH 32.6 26.6 - 33.0 pg    MCHC 33.4 31.5 - 35.7 g/dL    RDW 13.2 12.3 - 15.4 %    RDW-SD 47.4 37.0 - 54.0 fl    MPV 9.4 6.0 - 12.0 fL    Platelets 242 140 - 450 10*3/mm3    Neutrophil % 62.4 42.7 - 76.0 %    Lymphocyte % 30.2 19.6 - 45.3 %    Monocyte % 6.0 5.0 - 12.0 %    Eosinophil % 0.6 0.3 - 6.2 %    Basophil % 0.5 0.0 - 1.5 %    Immature Grans % 0.3 0.0 - 0.5 %    Neutrophils, Absolute 3.86 1.70 - 7.00 10*3/mm3     Lymphocytes, Absolute 1.87 0.70 - 3.10 10*3/mm3    Monocytes, Absolute 0.37 0.10 - 0.90 10*3/mm3    Eosinophils, Absolute 0.04 0.00 - 0.40 10*3/mm3    Basophils, Absolute 0.03 0.00 - 0.20 10*3/mm3    Immature Grans, Absolute 0.02 0.00 - 0.05 10*3/mm3    nRBC 0.0 0.0 - 0.2 /100 WBC     Result Review :                  Assessment and Plan    Diagnoses and all orders for this visit:    1. Acute URI (Primary)     Sudafed and flonase for congestion.      BMI is within normal parameters. No other follow-up for BMI required.             No outpatient medications prior to visit.     No facility-administered medications prior to visit.     No orders of the defined types were placed in this encounter.    [unfilled]  There are no discontinued medications.      Return if symptoms worsen or fail to improve.    Patient was given instructions and counseling regarding her condition or for health maintenance advice. Please see specific information pulled into the AVS if appropriate.

## 2025-05-16 ENCOUNTER — TRANSCRIBE ORDERS (OUTPATIENT)
Dept: LAB | Facility: HOSPITAL | Age: 50
End: 2025-05-16
Payer: COMMERCIAL

## 2025-05-16 ENCOUNTER — APPOINTMENT (OUTPATIENT)
Dept: GENERAL RADIOLOGY | Facility: HOSPITAL | Age: 50
End: 2025-05-16
Payer: COMMERCIAL

## 2025-05-16 ENCOUNTER — ANCILLARY PROCEDURE (OUTPATIENT)
Dept: LAB | Facility: HOSPITAL | Age: 50
End: 2025-05-16
Payer: COMMERCIAL

## 2025-05-16 ENCOUNTER — ANESTHESIA (OUTPATIENT)
Dept: PERIOP | Facility: HOSPITAL | Age: 50
End: 2025-05-16
Payer: COMMERCIAL

## 2025-05-16 ENCOUNTER — ANESTHESIA EVENT (OUTPATIENT)
Dept: PERIOP | Facility: HOSPITAL | Age: 50
End: 2025-05-16
Payer: COMMERCIAL

## 2025-05-16 ENCOUNTER — HOSPITAL ENCOUNTER (OUTPATIENT)
Facility: HOSPITAL | Age: 50
Setting detail: HOSPITAL OUTPATIENT SURGERY
Discharge: HOME | End: 2025-05-16
Attending: STUDENT IN AN ORGANIZED HEALTH CARE EDUCATION/TRAINING PROGRAM | Admitting: STUDENT IN AN ORGANIZED HEALTH CARE EDUCATION/TRAINING PROGRAM
Payer: COMMERCIAL

## 2025-05-16 VITALS
RESPIRATION RATE: 16 BRPM | HEART RATE: 55 BPM | OXYGEN SATURATION: 98 % | DIASTOLIC BLOOD PRESSURE: 62 MMHG | TEMPERATURE: 97.8 F | SYSTOLIC BLOOD PRESSURE: 112 MMHG

## 2025-05-16 DIAGNOSIS — N64.9 BREAST LESION: Primary | ICD-10-CM

## 2025-05-16 DIAGNOSIS — N60.92 ATYPICAL DUCTAL HYPERPLASIA OF LEFT BREAST: ICD-10-CM

## 2025-05-16 DIAGNOSIS — N64.9 BREAST LESION: ICD-10-CM

## 2025-05-16 PROCEDURE — 76098 X-RAY EXAM SURGICAL SPECIMEN: CPT

## 2025-05-16 PROCEDURE — 25810000003 LACTATED RINGERS PER 1000 ML

## 2025-05-16 PROCEDURE — 88307 TISSUE EXAM BY PATHOLOGIST: CPT | Performed by: STUDENT IN AN ORGANIZED HEALTH CARE EDUCATION/TRAINING PROGRAM

## 2025-05-16 PROCEDURE — 25010000002 LIDOCAINE 2% SOLUTION

## 2025-05-16 PROCEDURE — 25010000002 FENTANYL CITRATE (PF) 50 MCG/ML SOLUTION

## 2025-05-16 PROCEDURE — 25010000002 FAMOTIDINE 10 MG/ML SOLUTION: Performed by: ANESTHESIOLOGY

## 2025-05-16 PROCEDURE — 25010000002 KETOROLAC TROMETHAMINE PER 15 MG

## 2025-05-16 PROCEDURE — 25010000002 DEXAMETHASONE SODIUM PHOSPHATE 20 MG/5ML SOLUTION

## 2025-05-16 PROCEDURE — 25010000002 CEFAZOLIN PER 500 MG: Performed by: STUDENT IN AN ORGANIZED HEALTH CARE EDUCATION/TRAINING PROGRAM

## 2025-05-16 PROCEDURE — 25010000002 MIDAZOLAM PER 1 MG: Performed by: ANESTHESIOLOGY

## 2025-05-16 PROCEDURE — 19301 PARTIAL MASTECTOMY: CPT | Performed by: STUDENT IN AN ORGANIZED HEALTH CARE EDUCATION/TRAINING PROGRAM

## 2025-05-16 PROCEDURE — 25010000002 LIDOCAINE 1% - EPINEPHRINE 1:100000 1 %-1:100000 SOLUTION 30 ML VIAL: Performed by: STUDENT IN AN ORGANIZED HEALTH CARE EDUCATION/TRAINING PROGRAM

## 2025-05-16 PROCEDURE — 25010000002 ONDANSETRON PER 1 MG

## 2025-05-16 PROCEDURE — 25010000002 BUPIVACAINE (PF) 0.5 % SOLUTION 10 ML VIAL: Performed by: STUDENT IN AN ORGANIZED HEALTH CARE EDUCATION/TRAINING PROGRAM

## 2025-05-16 PROCEDURE — S0260 H&P FOR SURGERY: HCPCS | Performed by: STUDENT IN AN ORGANIZED HEALTH CARE EDUCATION/TRAINING PROGRAM

## 2025-05-16 PROCEDURE — 81025 URINE PREGNANCY TEST: CPT | Performed by: ANESTHESIOLOGY

## 2025-05-16 PROCEDURE — 25010000002 MIDAZOLAM PER 1 MG

## 2025-05-16 PROCEDURE — 25010000002 PROPOFOL 200 MG/20ML EMULSION

## 2025-05-16 PROCEDURE — 25810000003 LACTATED RINGERS PER 1000 ML: Performed by: ANESTHESIOLOGY

## 2025-05-16 DEVICE — LIGACLIP MCA MULTIPLE CLIP APPLIERS, 20 MEDIUM CLIPS
Type: IMPLANTABLE DEVICE | Site: BREAST | Status: FUNCTIONAL
Brand: LIGACLIP

## 2025-05-16 RX ORDER — FENTANYL CITRATE 50 UG/ML
INJECTION, SOLUTION INTRAMUSCULAR; INTRAVENOUS AS NEEDED
Status: DISCONTINUED | OUTPATIENT
Start: 2025-05-16 | End: 2025-05-16 | Stop reason: SURG

## 2025-05-16 RX ORDER — FLUMAZENIL 0.1 MG/ML
0.2 INJECTION INTRAVENOUS AS NEEDED
Status: DISCONTINUED | OUTPATIENT
Start: 2025-05-16 | End: 2025-05-16 | Stop reason: HOSPADM

## 2025-05-16 RX ORDER — IPRATROPIUM BROMIDE AND ALBUTEROL SULFATE 2.5; .5 MG/3ML; MG/3ML
3 SOLUTION RESPIRATORY (INHALATION) ONCE AS NEEDED
Status: DISCONTINUED | OUTPATIENT
Start: 2025-05-16 | End: 2025-05-16 | Stop reason: HOSPADM

## 2025-05-16 RX ORDER — ONDANSETRON 2 MG/ML
4 INJECTION INTRAMUSCULAR; INTRAVENOUS ONCE AS NEEDED
Status: DISCONTINUED | OUTPATIENT
Start: 2025-05-16 | End: 2025-05-16 | Stop reason: HOSPADM

## 2025-05-16 RX ORDER — HYDRALAZINE HYDROCHLORIDE 20 MG/ML
5 INJECTION INTRAMUSCULAR; INTRAVENOUS
Status: DISCONTINUED | OUTPATIENT
Start: 2025-05-16 | End: 2025-05-16 | Stop reason: HOSPADM

## 2025-05-16 RX ORDER — SODIUM CHLORIDE 0.9 % (FLUSH) 0.9 %
3 SYRINGE (ML) INJECTION EVERY 12 HOURS SCHEDULED
Status: DISCONTINUED | OUTPATIENT
Start: 2025-05-16 | End: 2025-05-16 | Stop reason: HOSPADM

## 2025-05-16 RX ORDER — FENTANYL CITRATE 50 UG/ML
50 INJECTION, SOLUTION INTRAMUSCULAR; INTRAVENOUS
Status: DISCONTINUED | OUTPATIENT
Start: 2025-05-16 | End: 2025-05-16 | Stop reason: HOSPADM

## 2025-05-16 RX ORDER — KETOROLAC TROMETHAMINE 30 MG/ML
INJECTION, SOLUTION INTRAMUSCULAR; INTRAVENOUS AS NEEDED
Status: DISCONTINUED | OUTPATIENT
Start: 2025-05-16 | End: 2025-05-16 | Stop reason: SURG

## 2025-05-16 RX ORDER — DIPHENHYDRAMINE HYDROCHLORIDE 50 MG/ML
12.5 INJECTION, SOLUTION INTRAMUSCULAR; INTRAVENOUS
Status: DISCONTINUED | OUTPATIENT
Start: 2025-05-16 | End: 2025-05-16 | Stop reason: HOSPADM

## 2025-05-16 RX ORDER — HYDROCODONE BITARTRATE AND ACETAMINOPHEN 5; 325 MG/1; MG/1
1 TABLET ORAL ONCE AS NEEDED
Status: DISCONTINUED | OUTPATIENT
Start: 2025-05-16 | End: 2025-05-16 | Stop reason: HOSPADM

## 2025-05-16 RX ORDER — LIDOCAINE HYDROCHLORIDE 10 MG/ML
0.5 INJECTION, SOLUTION INFILTRATION; PERINEURAL ONCE AS NEEDED
Status: DISCONTINUED | OUTPATIENT
Start: 2025-05-16 | End: 2025-05-16 | Stop reason: HOSPADM

## 2025-05-16 RX ORDER — PROPOFOL 10 MG/ML
INJECTION, EMULSION INTRAVENOUS AS NEEDED
Status: DISCONTINUED | OUTPATIENT
Start: 2025-05-16 | End: 2025-05-16 | Stop reason: SURG

## 2025-05-16 RX ORDER — LIDOCAINE HYDROCHLORIDE 20 MG/ML
INJECTION, SOLUTION INFILTRATION; PERINEURAL AS NEEDED
Status: DISCONTINUED | OUTPATIENT
Start: 2025-05-16 | End: 2025-05-16 | Stop reason: SURG

## 2025-05-16 RX ORDER — DEXAMETHASONE SODIUM PHOSPHATE 4 MG/ML
INJECTION, SOLUTION INTRA-ARTICULAR; INTRALESIONAL; INTRAMUSCULAR; INTRAVENOUS; SOFT TISSUE AS NEEDED
Status: DISCONTINUED | OUTPATIENT
Start: 2025-05-16 | End: 2025-05-16 | Stop reason: SURG

## 2025-05-16 RX ORDER — FAMOTIDINE 10 MG/ML
20 INJECTION, SOLUTION INTRAVENOUS ONCE
Status: COMPLETED | OUTPATIENT
Start: 2025-05-16 | End: 2025-05-16

## 2025-05-16 RX ORDER — MIDAZOLAM HYDROCHLORIDE 1 MG/ML
1 INJECTION, SOLUTION INTRAMUSCULAR; INTRAVENOUS
Status: DISCONTINUED | OUTPATIENT
Start: 2025-05-16 | End: 2025-05-16 | Stop reason: HOSPADM

## 2025-05-16 RX ORDER — PROMETHAZINE HYDROCHLORIDE 25 MG/1
25 TABLET ORAL ONCE AS NEEDED
Status: DISCONTINUED | OUTPATIENT
Start: 2025-05-16 | End: 2025-05-16 | Stop reason: HOSPADM

## 2025-05-16 RX ORDER — DROPERIDOL 2.5 MG/ML
0.62 INJECTION, SOLUTION INTRAMUSCULAR; INTRAVENOUS
Status: DISCONTINUED | OUTPATIENT
Start: 2025-05-16 | End: 2025-05-16 | Stop reason: HOSPADM

## 2025-05-16 RX ORDER — LABETALOL HYDROCHLORIDE 5 MG/ML
5 INJECTION, SOLUTION INTRAVENOUS
Status: DISCONTINUED | OUTPATIENT
Start: 2025-05-16 | End: 2025-05-16 | Stop reason: HOSPADM

## 2025-05-16 RX ORDER — SODIUM CHLORIDE, SODIUM LACTATE, POTASSIUM CHLORIDE, CALCIUM CHLORIDE 600; 310; 30; 20 MG/100ML; MG/100ML; MG/100ML; MG/100ML
9 INJECTION, SOLUTION INTRAVENOUS CONTINUOUS
Status: DISCONTINUED | OUTPATIENT
Start: 2025-05-16 | End: 2025-05-16 | Stop reason: HOSPADM

## 2025-05-16 RX ORDER — SODIUM CHLORIDE 0.9 % (FLUSH) 0.9 %
3-10 SYRINGE (ML) INJECTION AS NEEDED
Status: DISCONTINUED | OUTPATIENT
Start: 2025-05-16 | End: 2025-05-16 | Stop reason: HOSPADM

## 2025-05-16 RX ORDER — FENTANYL CITRATE 50 UG/ML
50 INJECTION, SOLUTION INTRAMUSCULAR; INTRAVENOUS ONCE AS NEEDED
Status: DISCONTINUED | OUTPATIENT
Start: 2025-05-16 | End: 2025-05-16 | Stop reason: HOSPADM

## 2025-05-16 RX ORDER — OXYCODONE AND ACETAMINOPHEN 7.5; 325 MG/1; MG/1
1 TABLET ORAL EVERY 4 HOURS PRN
Status: DISCONTINUED | OUTPATIENT
Start: 2025-05-16 | End: 2025-05-16 | Stop reason: HOSPADM

## 2025-05-16 RX ORDER — SODIUM CHLORIDE, SODIUM LACTATE, POTASSIUM CHLORIDE, CALCIUM CHLORIDE 600; 310; 30; 20 MG/100ML; MG/100ML; MG/100ML; MG/100ML
INJECTION, SOLUTION INTRAVENOUS CONTINUOUS PRN
Status: DISCONTINUED | OUTPATIENT
Start: 2025-05-16 | End: 2025-05-16 | Stop reason: SURG

## 2025-05-16 RX ORDER — ACETAMINOPHEN 500 MG
1000 TABLET ORAL ONCE
Status: COMPLETED | OUTPATIENT
Start: 2025-05-16 | End: 2025-05-16

## 2025-05-16 RX ORDER — PROMETHAZINE HYDROCHLORIDE 25 MG/1
25 SUPPOSITORY RECTAL ONCE AS NEEDED
Status: DISCONTINUED | OUTPATIENT
Start: 2025-05-16 | End: 2025-05-16 | Stop reason: HOSPADM

## 2025-05-16 RX ORDER — MIDAZOLAM HYDROCHLORIDE 1 MG/ML
INJECTION, SOLUTION INTRAMUSCULAR; INTRAVENOUS AS NEEDED
Status: DISCONTINUED | OUTPATIENT
Start: 2025-05-16 | End: 2025-05-16 | Stop reason: SURG

## 2025-05-16 RX ORDER — ATROPINE SULFATE 0.4 MG/ML
0.4 INJECTION, SOLUTION INTRAMUSCULAR; INTRAVENOUS; SUBCUTANEOUS ONCE AS NEEDED
Status: DISCONTINUED | OUTPATIENT
Start: 2025-05-16 | End: 2025-05-16 | Stop reason: HOSPADM

## 2025-05-16 RX ORDER — ONDANSETRON 2 MG/ML
INJECTION INTRAMUSCULAR; INTRAVENOUS AS NEEDED
Status: DISCONTINUED | OUTPATIENT
Start: 2025-05-16 | End: 2025-05-16 | Stop reason: SURG

## 2025-05-16 RX ORDER — NALOXONE HCL 0.4 MG/ML
0.2 VIAL (ML) INJECTION AS NEEDED
Status: DISCONTINUED | OUTPATIENT
Start: 2025-05-16 | End: 2025-05-16 | Stop reason: HOSPADM

## 2025-05-16 RX ORDER — EPHEDRINE SULFATE 50 MG/ML
5 INJECTION, SOLUTION INTRAVENOUS ONCE AS NEEDED
Status: DISCONTINUED | OUTPATIENT
Start: 2025-05-16 | End: 2025-05-16 | Stop reason: HOSPADM

## 2025-05-16 RX ORDER — PROPOFOL 10 MG/ML
INJECTION, EMULSION INTRAVENOUS CONTINUOUS PRN
Status: DISCONTINUED | OUTPATIENT
Start: 2025-05-16 | End: 2025-05-16 | Stop reason: SURG

## 2025-05-16 RX ORDER — HYDROMORPHONE HYDROCHLORIDE 1 MG/ML
0.5 INJECTION, SOLUTION INTRAMUSCULAR; INTRAVENOUS; SUBCUTANEOUS
Status: DISCONTINUED | OUTPATIENT
Start: 2025-05-16 | End: 2025-05-16 | Stop reason: HOSPADM

## 2025-05-16 RX ADMIN — MIDAZOLAM 1 MG: 1 INJECTION INTRAMUSCULAR; INTRAVENOUS at 09:42

## 2025-05-16 RX ADMIN — SODIUM CHLORIDE, POTASSIUM CHLORIDE, SODIUM LACTATE AND CALCIUM CHLORIDE 9 ML/HR: 600; 310; 30; 20 INJECTION, SOLUTION INTRAVENOUS at 09:33

## 2025-05-16 RX ADMIN — PROPOFOL 50 MG: 10 INJECTION, EMULSION INTRAVENOUS at 12:55

## 2025-05-16 RX ADMIN — MIDAZOLAM HYDROCHLORIDE 2 MG: 1 INJECTION, SOLUTION INTRAMUSCULAR; INTRAVENOUS at 12:55

## 2025-05-16 RX ADMIN — PROPOFOL 150 MCG/KG/MIN: 10 INJECTION, EMULSION INTRAVENOUS at 12:55

## 2025-05-16 RX ADMIN — LIDOCAINE HYDROCHLORIDE 80 MG: 20 INJECTION, SOLUTION INFILTRATION; PERINEURAL at 12:55

## 2025-05-16 RX ADMIN — DEXAMETHASONE SODIUM PHOSPHATE 8 MG: 4 INJECTION, SOLUTION INTRAMUSCULAR; INTRAVENOUS at 12:55

## 2025-05-16 RX ADMIN — KETOROLAC TROMETHAMINE 30 MG: 30 INJECTION, SOLUTION INTRAMUSCULAR; INTRAVENOUS at 12:55

## 2025-05-16 RX ADMIN — FENTANYL CITRATE 100 MCG: 50 INJECTION, SOLUTION INTRAMUSCULAR; INTRAVENOUS at 12:55

## 2025-05-16 RX ADMIN — CEFAZOLIN 2000 MG: 2 INJECTION, POWDER, FOR SOLUTION INTRAMUSCULAR; INTRAVENOUS at 12:35

## 2025-05-16 RX ADMIN — KETOROLAC TROMETHAMINE 30 MG: 30 INJECTION, SOLUTION INTRAMUSCULAR; INTRAVENOUS at 14:20

## 2025-05-16 RX ADMIN — FAMOTIDINE 20 MG: 10 INJECTION INTRAVENOUS at 09:42

## 2025-05-16 RX ADMIN — SODIUM CHLORIDE, POTASSIUM CHLORIDE, SODIUM LACTATE AND CALCIUM CHLORIDE: 600; 310; 30; 20 INJECTION, SOLUTION INTRAVENOUS at 12:55

## 2025-05-16 RX ADMIN — ACETAMINOPHEN 1000 MG: 500 TABLET, FILM COATED ORAL at 16:23

## 2025-05-16 RX ADMIN — ONDANSETRON 8 MG: 2 INJECTION, SOLUTION INTRAMUSCULAR; INTRAVENOUS at 14:20

## 2025-05-16 NOTE — DISCHARGE INSTRUCTIONS
Excisional Biopsy OR Partial Mastectomy    Preparing for your Surgery:  You will be scheduled for an appointment in the days before surgery to have blood work and other possible tests to make sure it's safe to have surgery.  You may be scheduled for other images OR procedures to help plan and guide the surgery.  You will go home the same day as your procedure. Please be sure a  is available to take you home, and someone can stay the rest of the day with you.   Remember - nothing to eat or drink after midnight the night before your surgery. It's OK to take your required medicine the morning of with a small sip of water.   Your Surgery:  Depending on the location and size of your abnormal tissue, you will wither have an incision near the mass or around part of your nipple.   The surgery removes the abnormal breast tissues and some of the normal surrounding tissue. The pathologists will examine the tissue to make a final diagnosis and evaluate the edges.  Your incision will be closed with stitches that will dissolve on their own. The incision will be closed with surgical glue. This glue will fall off in several days.  After Your Surgery  Do not drive or make important decisions the day of surgery.  Someone should stay with you the rest of the day and overnight.  Wear your surgical bra, compression bra or ace bandage wrap until your follow up appointment. OK to remove it to shower.  You may shower 24 hours after surgery. Do not scrub the incision, let soapy water run down over the incision, then pat dry. Keep the incision clean and dry. No baths, pools, or spas until your follow up appointment.   OK to use ice packs for pain control. Please do not use heating pads.    Diet/Activity  OK to eat a regular diet. Do not drink alcohol while taking pain medication.  No lifting, pushing, pulling anything over 5 lbs - avoid sports, heavy work, stair climbers, or elliptical machines.   To reduce the risk of blood clots, get  up and move every 90 minutes during the day. Do not lay or sit in one position for more than 90 minutes.     Medications:  Please take Ibuprofen and Acetaminophen for pain control. You can alternate with 600 mg of ibuprofen every 6 hours then, 650 mg of acetaminophen every 6 hours.  For Example:  9 am: Acetaminophen  12 pm (noon): Ibuprofen  3pm: Acetaminophen  6 pm: Ibuprofen  9pm: Acetaminophen  Midnight: Ibuprofen  3am: Acetaminophen  IF you are taking prescription pain medication, take stool softners to prevent constipation.   Reasons to call the office or your Surgeon:  If you notice: redness, swelling, odor or drainage from the incision  If you have a fever greater than 101F (38C)  A small amount of bloody/blood-tinged fluid from your incision is normal. If there is excessive bleeding, please call the office.     Coleen Nunn MD  [facility]  [place] [address] [phone]

## 2025-05-16 NOTE — OP NOTE
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    A Breast, Left Tissue TISSUE PATHOLOGY EXAM   Coleen Nunn MD 5/16/25 1355 No    Description: Partial mastectomy - short stitch superior, long stitch lateral    B Breast, Left Tissue TISSUE PATHOLOGY EXAM   Coleen Nunn MD 5/16/25 1355 No    Description: Partial mastectomy - posterior margin, stitch marks margin            Findings: clip and analilia within specimen    Complications: none    Description of Procedure:   The risks and benefits of the procedure were explained in detail to the patient.  Informed consent was obtained.  Prior to arrival, the patient had a radiographically localizing ANALILIA placed for proper identification of the breast lesion in question. The patient was then taken to the operating room and placed supine on the operating room table.  Sequential compression devices were applied to the lower extremities and preoperative antibiotics administered. A time out was then performed to identify the proper patient, procedure, and location.     After the administration of adequate anesthesia, the left breast was prepped and draped in the standard surgical fashion.  A medial periareolar incision was then made on the left breast based on the mammographic images and the positioning of the localizing ANALILIA.  Dissection was carried through the skin to the underlying subcutaneous tissues.  Sharp dissection  with the use of electrocautery was utilized to dissect down to the level of the clip and microcalcifications. The surrounding breast tissue was very dence and cystic with multiple cysts encountered with a variety of fluid, some serous, some with tan fluid.  The region was dissected free from the rest of the breast and removed, care taken to ensure that the ANALILIA was intact and the lesion in question was positioned near the center of the resected specimen.  The specimen was then marked short stitch superiorly, long stitch laterally.  Intraoperaive radiograph showed calcifications, additional posterior margin was obtained and the intraoperative radiograph then confirmed that the analilia and the previously placed biopsy clip were within the specimen.   Hemostasis ensured.  The underlying breast tissue was re-approximated with a 2-0 Vicryl suture. The incision was then closed in two layers with absorbable suture (3-0 Vicryl deep dermal and 4-0 Monocryl subcuticular) and Dermabond applied.  The patient tolerated the procedure well and was transported to the post anesthesia care unit in stable condition.    This procedure was not performed to treat breast cancer through sentinel node biopsy     Coleen Nunn MD     Date: 5/16/2025  Time: 14:34 EDT

## 2025-05-16 NOTE — ANESTHESIA PREPROCEDURE EVALUATION
Anesthesia Evaluation     Patient summary reviewed and Nursing notes reviewed   history of anesthetic complications:  PONV  NPO Solid Status: > 8 hours  NPO Liquid Status: > 2 hours           Airway   Mallampati: I  TM distance: >3 FB  No difficulty expected  Dental - normal exam     Comment: Lower retainer    Pulmonary - normal exam   (-) COPD, asthma  Cardiovascular   Exercise tolerance: good (4-7 METS)    Rhythm: regular    (-) past MI, angina, cardiac stents      Neuro/Psych  (-) seizures, CVA  GI/Hepatic/Renal/Endo    (-) GERD, liver disease, no renal disease    Musculoskeletal     Abdominal    Substance History - negative use     OB/GYN          Other - negative ROS                     Anesthesia Plan    ASA 2     MAC     intravenous induction     Anesthetic plan, risks, benefits, and alternatives have been provided, discussed and informed consent has been obtained with: patient.  Pre-procedure education provided      CODE STATUS:

## 2025-05-16 NOTE — ANESTHESIA POSTPROCEDURE EVALUATION
Patient: Geri Romeo    Procedure Summary       Date: 05/16/25 Room / Location: [facility] OR [address] [place] [address] MAIN OR    Anesthesia Start: 1239 Anesthesia Stop: 1432    Procedure: Left Breast partial mastectomy, analilia localized (Left: Breast) Diagnosis:       Atypical ductal hyperplasia of left breast      (Atypical ductal hyperplasia of left breast [N60.92])    Surgeons: Coleen Nunn MD Provider: Edin Olson MD    Anesthesia Type: MAC ASA Status: 2            Anesthesia Type: MAC    Vitals  Vitals Value Taken Time   /62 05/16/25 16:00   Temp 36.6 °C (97.8 °F) 05/16/25 15:45   Pulse 57 05/16/25 16:02   Resp 16 05/16/25 16:00   SpO2 100 % 05/16/25 16:02   Vitals shown include unfiled device data.        Post Anesthesia Care and Evaluation    Pain management: adequate    Airway patency: patent  Anesthetic complications: No anesthetic complications    Cardiovascular status: acceptable  Respiratory status: acceptable  Hydration status: acceptable    Comments: /62   Pulse 58   Temp 36.6 °C (97.8 °F) (Oral)   Resp 16   SpO2 100%

## 2025-05-16 NOTE — H&P
Breast Surgical Oncology    General History:  Geri Romeo is a 49 y.o. female with the following history: Recently met with her in August 2024 to discuss incidentally found atypical ductal hyperplasia.  She has a history of multiple cysts in bilateral breast and on short interval follow-up for the cyst was noted to have new calcifications.  Calcifications were noted to be atypical hyperplasia for which she underwent surgical excision, final pathology was noted to be benign breast tissue.  Prior to surgery she underwent bilateral breast MRI which showed no additional areas of concern outside of biopsied area of atypical ductal hyperplasia.      Interval History: Since that time she is been in her usual state of health.  Notes no new breast complaints since our last visit.  She met with medical oncology and was planning to initiate hormonal blockade for chemoprevention.  She was completing her routine annual mammogram when a new area of calcifications in the left breast were noted and biopsy returned as a new focus of atypical ductal hyperplasia.  She denies any changes in her breast, no new masses, skin changes or nipple discharge.  No changes to her personal or family history.    Past Medical History:   Diagnosis Date    Atypical ductal hyperplasia of right breast     History of COVID-19     PONV (postoperative nausea and vomiting)     NAUSEA    Rosacea      Patient Active Problem List   Diagnosis    Acne vulgaris    Cystic breast, unspecified laterality    Solitary cyst of right breast    Atypical ductal hyperplasia of right breast    Rosacea    Encounter for screening colonoscopy    Family history of colonic polyps    Atypical ductal hyperplasia of left breast     No current facility-administered medications on file prior to encounter.     No current outpatient medications on file prior to encounter.     No Known Allergies    Past Surgical History:   Procedure Laterality Date    APPENDECTOMY  1984    BREAST  LUMPECTOMY Right 09/24/2024    Procedure: right breast partial mastectomy, analilia guided;  Surgeon: Coleen Nunn MD;  Location:  [facility] MAIN OR;  Service: General;  Laterality: Right;    COLONOSCOPY W/ POLYPECTOMY N/A 3/5/2025    Procedure: COLONOSCOPY WITH POLYPECTOMY;  Surgeon: Juan Miguel Castañeda MD;  Location:  [facility] OR;  Service: Gastroenterology;  Laterality: N/A;  sigmoid polyp    CYST REMOVAL  2015    SCALP    ENDOMETRIAL ABLATION  2022    LACERATION REPAIR Right 06/2024    FOOT     Social History     Socioeconomic History    Marital status: Unknown   Tobacco Use    Smoking status: Never     Passive exposure: Never    Smokeless tobacco: Never   Vaping Use    Vaping status: Never Used   Substance and Sexual Activity    Alcohol use: Yes     Alcohol/week: 2.0 standard drinks of alcohol     Types: 2 Glasses of wine per week     Comment: OCCASIONAL    Drug use: No    Sexual activity: Yes     Partners: Male     Birth control/protection: Vasectomy     Comment:  has had a vascectomy     Family History   Problem Relation Age of Onset    No Known Problems Mother     No Known Problems Father     No Known Problems Brother         (twin)    Leukemia Brother         CLL    Parkinsonism Maternal Grandmother     Coronary artery disease Maternal Grandfather     Stroke Maternal Grandfather     No Known Problems Paternal Grandmother     Glaucoma Paternal Grandfather     Malig Hyperthermia Neg Hx         Review of Systems:  CONSTITUTIONAL: No weight loss, fever, chills, weakness or fatigue.  HEENT: Eyes: No visual loss, blurred vision, double vision or yellow sclerae. Ears, Nose, Throat: No hearing loss, sneezing, congestion, runny nose or sore throat.  SKIN: No rash or itching.  CARDIOVASCULAR: No chest pain, chest pressure or chest discomfort. No palpitations or edema.  RESPIRATORY: No shortness of breath, cough or sputum.  GASTROINTESTINAL: No anorexia, nausea, vomiting or diarrhea. No abdominal pain or  blood.  GENITOURINARY: No burning on urination  NEUROLOGICAL: No headache, dizziness, syncope, paralysis, ataxia, numbness or tingling in the extremities. No change in bowel or bladder control.  MUSCULOSKELETAL: No muscle, back pain, joint pain or stiffness.  HEMATOLOGIC: No anemia, bleeding or bruising.  LYMPHATICS: No enlarged nodes.  PSYCHIATRIC: No history of depression or anxiety.  ENDOCRINOLOGIC: No reports of sweating, cold or heat intolerance. No polyuria or polydipsia.  ALLERGIES: No history of asthma, hives, eczema or rhinitis.    Physical Exam:  Vitals:    25 0950   BP:    Pulse: 66   Resp: 16   Temp:    SpO2: 99%         General: Alert, cooperative    HEENT: Atraumatic, normocephalic    Oral/Maxillofacial: Moist mucous membranes    Neck: Supple     Lungs: EWOB, clear to auscultation bilaterally     Heart: RRR    Breast: Bilateral breasts examined sitting and supine.  RIGHT-well-healed radial incision to upper outer quadrant, no masses outside of ongoing resolution of her surgical bed in the upper outer quadrant with denser ridges of breast tissue, skin changes, or nipple discharge  LEFT- no masses, skin changes, or nipple discharge    Lymphatics:  Bilateral supraclavicular, cervical, and axillary basins without lymphadneopathy    Abdomen: Soft, non-tender, non-distended    Extremities: normal strength and sensation.  No obvious deformities.     Neuro: alert, normal speech, no focal findings or movement disorder noted     Skin: no lesions or abrasions      Recent Imagin/15/2024 Preop Breast MRI  FINDINGS: There is heterogeneous fibroglandular tissue. There is marked  background parenchymal enhancement, right greater than left, which  limits the sensitivity for detection of invasive malignancy and DCIS.   There are numerous bilateral breast cysts, some of which demonstrate  intrinsic T1 hyperintense signal consistent with complicated  proteinaceous/hemorrhagic cysts. Additionally, there is  intrinsic T1  hyperintense signal within multiple ducts in both breasts, right greater  than left, consistent with proteinaceous and/or hemorrhagic contents.     RIGHT BREAST:    At 8-9 o'clock in the posterior right breast, 10 cm posterior to the  nipple, there is a 1.5 cm AP dimension, 1.7 cm transverse dimension, 1.5  cm craniocaudal dimension T1 hyperintense postbiopsy hematoma  representing the site of biopsy-proven atypia. A focus of susceptibility  from a biopsy clip (TOPHAT) is located at 9:00 in the posterior right  breast, 9.4 cm posterior to the nipple, approximately 0.8 cm anterior  and superior to the superior aspect of the postbiopsy hematoma. The clip  was previously noted to be displaced from the biopsy site on the  postbiopsy mammogram. No suspicious enhancing mass or area of non-mass  enhancement is identified at the biopsy site or elsewhere within the  right breast.  The visualized axilla is within normal limits.     LEFT BREAST:    No suspicious enhancing mass or area of non-mass enhancement is  identified.  The visualized axilla is within normal limits.     EXTRAMAMMARY FINDINGS:  There are no pathologically enlarged internal mammary chain lymph nodes  on either side.    There are incompletely assessed T2 hyperintense hepatic lesions.      IMPRESSION AND RECOMMENDATION:   1.  A 1.7 cm post biopsy hematoma at 8-9 o'clock in the posterior right  breast represents the site of biopsy-proven atypia. The biopsy clip is  located approximately 0.8 cm from the periphery of the hematoma and was  noted to be displaced on postbiopsy mammogram. Recommend surgical  management with preoperative localization targeting the biopsy site.  2.  No MRI evidence of malignancy in the left breast.  BI-RADS Category 4    1/31/2025 bilateral screening mammogram ([facility])  Breasts are extremely dense which lowers the sensitivity of mammography.  Finding 1: Multiple similar oval masses seen in both breast.  No new suspicious  masses, calcifications or area of architectural distortion.  Finding 2: Postsurgical scar in the upper outer region of the right breast.  Area of prior excisional biopsy.  Finding 3: Round rim calcifications with diffuse distribution seen in both breast.  Finding 4: Calcifications in the posterior one third upper inner region of the left breast  Impression:  Findings 1 2 and 3 are benign  Finding 4 calcifications in the left breast require additional evaluation.  BI-RADS 0    2/18/2025 left diagnostic mammogram ([facility])  Additional evaluation for calcifications of the left breast seen on 1/31/2025, on present there are calcifications measuring 5 mm with grouped distribution in the upper inner region of the left breast.  Impression: Calcifications in the left breast are suspicious biopsy is recommended  BI-RADS 4A    3/6/2025 stereotactic biopsy  9 gauge vacuum-assisted device 8 specimens were obtained.  Specimen radiograph shows calcifications within the sample.  A Top-Hat shaped biopsy clip was deployed within the biopsy bed.  Pathology shows atypical ductal hyperplasia, is concordant with imaging.    Pathology:  3/6/25  1.  Left breast, upper inner quadrant, stereotactic biopsies for calcifications (Top-Hat):               -Clustered cysts with atypical ductal hyperplasia (ADH) with associated calcifications.    Assessment/Plan: Geri Romeo is a 49 y.o.female with h/o right atypical ductal hyperplasia.  Now with a new focus of left atypical ductal hyperplasia  - Consents completed and signed. Discussed the risks and benefits of left partial mastectomy, LUZ ELENA guided at length including estimated time for procedure, postoperative recovery, and postoperative instructions. Discussed the risks to include pain, bleeding, infection, nerve injury, numbness, seroma, skin complications including necrosis, breast asymmetry, need for re-excision and scar.  Patient appears to understand and wishes to proceed.  All questions  were answered.  - OR today  - Follow-up with Dr. Jenkins following surgery to discuss final pathology and recommendations for chemoprevention  - Continue annual MRIs in August.  - Continue screening mammograms in January.  -Pending final pathology will follow-up with high risk nurse practitioners after MRI in August for ongoing imaging follow-up and clinical exams.  Will plan to see on an annual basis as long as she follows with medical oncology.

## 2025-05-16 NOTE — PERIOPERATIVE NURSING NOTE
Chatted with Dr. Nunn about pain meds, stated to use Tylenol, Ibuprofen, and compression bra, along with ice.

## 2025-05-22 NOTE — PROGRESS NOTES
Follow-up    05/28/2025, Interval History  She is here for follow-up.  Recently had left partial mastectomy from which she is recovering quite well.  Has some hot flashes and  vaginal dryness.  No other issues    HISTORY OF PRESENT ILLNESS:  The patient is a 49 y.o. year old female recently diagnosed right breast atypical ductal hyperplasia, here to discuss chemoprevention.  Oncologic history outlined below    Oncology history:  1/26/2024 bilateral diagnostic mammogram-breast extremely dense.  Finding 1 isodense oval mass obscured margins in left breast 6:00 6 cm from nipple.  Finding 2 isodense oval masses with circumscribed margin in both breast.  Left breast ultrasound-finding 1 ultrasound shows oval partially complex mixed cystic and solid lesion with partially defined margins measuring 10 x 9 x 7 mm in left breast at 6:00 6 cm from nipple.  Finding 3 oval parallel anechoic simple cyst with circumscribed margin in left breast 5 cm from nipple.  BI-RADS 4B.  2/6/2024 ultrasound-guided cyst aspiration  7/9/2024 right diagnostic mammogram-palpable lump or thickening in right breast and post cyst aspiration in left breast at 6:00.  Breast extremely dense.  Finding 1 multiple oval masses of varying size obscuring margins in right breast.  Finding 2 oval mass with obscured margin in right breast correlates with pinpointed palpable area at 1230 o'clock.  Finding 3 amorphous calcifications measuring 5 mm with grouped distribution in posterior one third of right breast at 10:00.  Bilateral breast ultrasound.  Finding 1 multiple similar masses in right breast stable and benign.  Finding 2 palpable masses in right breast benign aspiration can be performed for symptom relief.  Pending 3 calcification right breast suspicious biopsy recommended.  BI-RADS 4A.  7/22/2024 right breast ultrasound-guided cyst aspiration-benign  7/22/2024 right breast 10:00 biopsy for calcifications-atypical ductal hyperplasia  8/15/2024 MRI  "bilateral breast: 1.7 cm postbiopsy hematoma at 8-9 o'clock in posterior right breast representing site of biopsy-proven atypia.  Biopsy clip located 0.8 cm from periphery of hematoma noted to be displaced on postbiopsy mammogram.  Normal MRI of left breast  9/24/2024 right breast excisional biopsy-negative for residual atypical ductal hyperplasia or flat epithelial atypia.  March 2024 left breast stereotactic biopsy-clustered cyst with atypical ductal hyperplasia with associated calcification.   5/16/2025 status post partial left mastectomy.  Final path-no atypical hyperplasia, in situ or carcinoma identified        MEDICATIONS    Current Outpatient Medications:     tamoxifen (NOLVADEX) 10 MG tablet, Take 0.5 tablets by mouth Daily for 90 days., Disp: 45 tablet, Rfl: 11    ALLERGIES:   No Known Allergies      I have reviewed Past Medical, Surgical History, Social History, Meds and Allergies.     REVIEW OF SYSTEMS:  See interval History           Vitals:    05/28/25 1330   BP: 116/78   Pulse: 80   Resp: 17   Temp: 97.8 °F (36.6 °C)   TempSrc: Oral   SpO2: 96%   Weight: 68 kg (149 lb 14.4 oz)   Height: 172 cm (67.72\")   PainSc: 0-No pain           5/28/2025     1:33 PM   Current Status   ECOG score 0        PHYSICAL EXAM:    Physical Exam  Constitutional:       Appearance: Normal appearance.   HENT:      Head: Normocephalic and atraumatic.      Mouth/Throat:      Mouth: Mucous membranes are moist.      Pharynx: Oropharynx is clear.   Eyes:      Extraocular Movements: Extraocular movements intact.      Pupils: Pupils are equal, round, and reactive to light.   Cardiovascular:      Rate and Rhythm: Normal rate and regular rhythm.   Pulmonary:      Effort: Pulmonary effort is normal.      Breath sounds: Normal breath sounds.   Chest:   Breasts:     Right: No mass, nipple discharge or skin change.      Left: No mass, nipple discharge or skin change.       Abdominal:      General: Bowel sounds are normal.      Palpations: " Abdomen is soft.   Musculoskeletal:      Cervical back: Normal range of motion and neck supple.   Lymphadenopathy:      Upper Body:      Right upper body: No axillary adenopathy.      Left upper body: No axillary adenopathy.   Neurological:      General: No focal deficit present.      Mental Status: She is alert and oriented to person, place, and time.   Psychiatric:         Mood and Affect: Mood normal.         Behavior: Behavior normal.      I have reexamined the patient and the results are consistent with the previously documented exam. Tory Jenkins MD        RECENT LABS:        WBC   Date Value Ref Range Status   05/01/2025 4.55 3.40 - 10.80 10*3/mm3 Final   03/25/2025 6.19 3.40 - 10.80 10*3/mm3 Final   11/07/2024 4.93 3.40 - 10.80 10*3/mm3 Final   09/13/2024 7.57 3.40 - 10.80 10*3/mm3 Final   04/10/2018 4.78 4.50 - 10.70 10*3/mm3 Final     Hemoglobin   Date Value Ref Range Status   05/01/2025 12.5 12.0 - 15.9 g/dL Final   03/25/2025 13.5 12.0 - 15.9 g/dL Final   11/07/2024 13.6 12.0 - 15.9 g/dL Final   09/13/2024 13.1 12.0 - 15.9 g/dL Final   04/10/2018 13.4 11.9 - 15.5 g/dL Final     Platelets   Date Value Ref Range Status   05/01/2025 275 140 - 450 10*3/mm3 Final   03/25/2025 242 140 - 450 10*3/mm3 Final   11/07/2024 226 140 - 450 10*3/mm3 Final   09/13/2024 292 140 - 450 10*3/mm3 Final   04/10/2018 256 140 - 500 10*3/mm3 Final       Pathology:  Tissue Pathology Exam (07/22/2024 08:45)   Tissue Pathology Exam (09/24/2024 09:52)     Imaging:  MRI Breast Bilateral Diagnostic W WO Contrast (08/15/2024 10:56)     Assessment:  *Atypical ductal hyperplasia, right breast s/p excisional biopsy, September 2024  *Atypical ductal hyperplasia, left breast s/p partial mastectomy with no residual atypical ductal hyperplasia, May 2025  *Extremely dense breast  *Postmenopausal  7/9/2024 right diagnostic mammogram-palpable lump or thickening in right breast and post cyst aspiration in left breast at 6:00.  Breast extremely  dense.  Finding 1 multiple oval masses of varying size obscuring margins in right breast.  Finding 2 oval mass with obscured margin in right breast correlates with pinpointed palpable area at 1230 o'clock.  Finding 3 amorphous calcifications measuring 5 mm with grouped distribution in posterior one third of right breast at 10:00.  Bilateral breast ultrasound.  Finding 1 multiple similar masses in right breast stable and benign.  Finding 2 palpable masses in right breast benign aspiration can be performed for symptom relief.  Pending 3 calcification right breast suspicious biopsy recommended.  BI-RADS 4A.  7/22/2024 right breast ultrasound-guided cyst aspiration-benign  7/22/2024 right breast 10:00 biopsy for calcifications-atypical ductal hyperplasia  8/15/2024 MRI bilateral breast: 1.7 cm postbiopsy hematoma at 8-9 o'clock in posterior right breast representing site of biopsy-proven atypia.  Biopsy clip located 0.8 cm from periphery of hematoma noted to be displaced on postbiopsy mammogram.  Normal MRI of left breast  9/24/2024 right breast excisional biopsy-negative for residual atypical ductal hyperplasia or flat epithelial atypia.  11/7/2024: Patient established care with me. Patient prefers to wait until she is in menopause before she considers chemoprevention  11/8/2024: FSH 85, LH 46.8.  3/24/2025: FSH 70, LH 24 estradiol less than 5   5/16/2025 status post partial left mastectomy.  Final path-no atypical hyperplasia, in situ or carcinoma identified  5/28/2025: Reviewed pathology from her recent partial left breast mastectomy.  Discussed chemoprevention with tamoxifen versus Arimidex.  Reviewed possible side effects of tamoxifen including but not limited to, hot flashes, night sweats, mood swings, vaginal dryness, rare but possible risk of blood clots, stroke, uterine cancer, cataracts.  Also reviewed the side effects of Arimidex including hot flashes, joint pains/arthralgias, muscle aches, gi effects  including stomach upset and loose stools, mood changes including depression, forgetfullness including difficulty with concentration, and bone mineral density changes (loss).  Patient prefers tamoxifen.      *Hot flashes due to menopause  Reviewed nonpharmaceutical measures such as dressing in layers, keeping thermostat at a cooler temperature, using fan for air circulation.  Trying vitamin E or magnesium over-the-counter.  Information provided in after visit summary    *Vaginal dryness due to menopause  Discussed nonhormonal measures such as Revaree or  HyaloGyn over-the-counter.  Information provided in after visit summary      Plan  Start tamoxifen 5 mg daily for chemoprevention.  Plan for a total of 3 years  MD visit in 2 months with labs for toxicity check

## 2025-05-28 ENCOUNTER — OFFICE VISIT (OUTPATIENT)
Dept: ONCOLOGY | Facility: CLINIC | Age: 50
End: 2025-05-28
Payer: COMMERCIAL

## 2025-05-28 ENCOUNTER — OFFICE VISIT (OUTPATIENT)
Dept: SURGERY | Facility: CLINIC | Age: 50
End: 2025-05-28
Payer: COMMERCIAL

## 2025-05-28 VITALS
HEIGHT: 68 IN | SYSTOLIC BLOOD PRESSURE: 120 MMHG | BODY MASS INDEX: 22.7 KG/M2 | WEIGHT: 149.8 LBS | HEART RATE: 77 BPM | OXYGEN SATURATION: 97 % | DIASTOLIC BLOOD PRESSURE: 80 MMHG

## 2025-05-28 VITALS
DIASTOLIC BLOOD PRESSURE: 78 MMHG | RESPIRATION RATE: 17 BRPM | HEIGHT: 68 IN | TEMPERATURE: 97.8 F | SYSTOLIC BLOOD PRESSURE: 116 MMHG | BODY MASS INDEX: 22.72 KG/M2 | WEIGHT: 149.9 LBS | HEART RATE: 80 BPM | OXYGEN SATURATION: 96 %

## 2025-05-28 DIAGNOSIS — N60.91 ATYPICAL DUCTAL HYPERPLASIA OF RIGHT BREAST: ICD-10-CM

## 2025-05-28 DIAGNOSIS — Z91.89 AT HIGH RISK FOR BREAST CANCER: ICD-10-CM

## 2025-05-28 DIAGNOSIS — N60.91 ATYPICAL DUCTAL HYPERPLASIA OF BOTH BREASTS: Primary | ICD-10-CM

## 2025-05-28 DIAGNOSIS — N60.92 ATYPICAL DUCTAL HYPERPLASIA OF BOTH BREASTS: Primary | ICD-10-CM

## 2025-05-28 DIAGNOSIS — N60.92 ATYPICAL DUCTAL HYPERPLASIA OF LEFT BREAST: Primary | ICD-10-CM

## 2025-05-28 PROCEDURE — 99024 POSTOP FOLLOW-UP VISIT: CPT | Performed by: STUDENT IN AN ORGANIZED HEALTH CARE EDUCATION/TRAINING PROGRAM

## 2025-05-28 RX ORDER — TAMOXIFEN CITRATE 10 MG/1
5 TABLET ORAL DAILY
Qty: 45 TABLET | Refills: 11 | Status: SHIPPED | OUTPATIENT
Start: 2025-05-28 | End: 2025-08-26

## 2025-05-28 NOTE — PATIENT INSTRUCTIONS
Hot Flashes  Recommendations:   Wear wicking fabrics, dressing in layers.   Keep room temperature at a lower setting; avoid hot baths/showers.   Keep air circulating with room fans or personal hand fan.   Reduce how much caffeine you use. Caffeine is in coffee, tea, brie  and chocolate.   Reduce how much alcohol you use, especially red and white greg.   Avoid foods and beverages hot in temperature or spicy.   Exercise daily, including yoga and meditation.   Try to reduce daily stress and aggravation. You may use stress reducing or relaxation  techniques, including massage.   Avoid smoking.  OTC supplements:  ? Vitamin E 800 mg daily  ? Vitamin B Complex  ? Magnesium Oxide 400 mg daily, can increase to 400 mg twice daily if symptoms do not  improve after 2 weeks of daily dosing  If these measures do not work, you can discuss possible use of medicine with your  doctor:   Effexor® (venlafaxine)   Neurontin® (gabapentin)   Paxil® (paroxetine)  Oxybutynin (ditropan)  All of these medicines are routinely used for other health reasons. For some women, these may reduce the incidence and severity of hot flashes. Often these medicines are used in very small doses, gradually increasing the dose to control the hot flashes. Talk with your health care provider for further guidance.      Vaginal Dryness    For vaginal dryness, try HyaloGyn or Revaree -both are available over-the-counter    Vaginal lubricants provide several hours worth of lubrication for use prior to intercourse. Apply directly to vagina or to partner’s penis or vaginal dilator. These can be purchased over-the-counter and are easy to find at any drugstore.. Some examples are listed below:    · Astroglide

## 2025-05-28 NOTE — LETTER
May 28, 2025     Asuncion Baugh, TARAN  [address]  [address]  [place] [address]    Patient: Geri Romeo   YOB: 1975   Date of Visit: 5/28/2025     Dear TARAN Serna:       Thank you for referring Geri Rmoeo to me for evaluation. Below are the relevant portions of my assessment and plan of care.    If you have questions, please do not hesitate to call me. I look forward to following Geri along with you.         Sincerely,        Coleen Nunn MD        CC: MD Edouard Hassan, MD Coleen  05/28/25 1931  Sign when Signing Visit  Breast Surgical Oncology Post-Op Visit    Surgery: left partial mastectomy - biopsy with atypical hyperplasia  Subjective: No acute issues. Denies f/c/wnd issues. Intermittent use of pain medication.     O:  Vitals:    05/28/25 1516   BP: 120/80   Pulse: 77   SpO2: 97%     Breast incision:  healing well without evidence of infection or significant seroma      Pathology:   Final Diagnosis   Date Value Ref Range Status   05/16/2025   Final    1.  Left breast, oriented LUZ ELENA-guided partial mastectomy (5 g):   - Columnar cell change and microcysts with associated calcifications.   - Sclerosing adenosis and clustered apocrine cysts.   - No clips or biopsy site changes identified.   - No atypical hyperplasia, in situ nor invasive carcinoma identified (margins clear).    2. Left breast, additional posterior margin, oriented excision (3 g):   - Benign breast parenchyma with clustered apocrine cysts.   - Luz Elena and Top-Hat clips retrieved.   - No distinct biopsy site changes identified.   - No atypical hyperplasia, in situ nor invasive carcinoma identified (margin clear).    SWM            Assessment: Ms Romeo is a 50 yo lady now s/p her second partial mastectomy for atypical hyperplasia. Right biopsy with ADH 9/24/24, left ADH on biopsy 3/6/25.  She met with medical oncology today to discuss risk reduction.  Discussed pathology at length.  All  questions answered.        Plan:   - RTC 6 months for clinical exam with the nurse practitioners  - Continue annual MRIs in August.ordered today  - Continue screening mammograms in January.  - Med Onc - planning tamoxifen 5mg for 3 years.      Coleen Nunn MD  Breast Surgical Oncology      Return in about 6 months (around 11/28/2025) for Breast MRI in august, Follow up with NP around october.

## 2025-05-28 NOTE — PROGRESS NOTES
Breast Surgical Oncology Post-Op Visit    Surgery: left partial mastectomy - biopsy with atypical hyperplasia  Subjective: No acute issues. Denies f/c/wnd issues. Intermittent use of pain medication.     O:  Vitals:    05/28/25 1516   BP: 120/80   Pulse: 77   SpO2: 97%     Breast incision:  healing well without evidence of infection or significant seroma      Pathology:   Final Diagnosis   Date Value Ref Range Status   05/16/2025   Final    1.  Left breast, oriented KASANDRA-guided partial mastectomy (5 g):   - Columnar cell change and microcysts with associated calcifications.   - Sclerosing adenosis and clustered apocrine cysts.   - No clips or biopsy site changes identified.   - No atypical hyperplasia, in situ nor invasive carcinoma identified (margins clear).    2. Left breast, additional posterior margin, oriented excision (3 g):   - Benign breast parenchyma with clustered apocrine cysts.   - Kasandra and Top-Hat clips retrieved.   - No distinct biopsy site changes identified.   - No atypical hyperplasia, in situ nor invasive carcinoma identified (margin clear).    SWM            Assessment: Ms Romeo is a 48 yo lady now s/p her second partial mastectomy for atypical hyperplasia. Right biopsy with ADH 9/24/24, left ADH on biopsy 3/6/25.  She met with medical oncology today to discuss risk reduction.  Discussed pathology at length.  All questions answered.        Plan:   - RTC 6 months for clinical exam with the nurse practitioners  - Continue annual MRIs in August.ordered today  - Continue screening mammograms in January.  - Med Onc - planning tamoxifen 5mg for 3 years.      Coleen Nunn MD  Breast Surgical Oncology      Return in about 6 months (around 11/28/2025) for Breast MRI in august, Follow up with NP around october.

## 2025-06-20 DIAGNOSIS — N60.92 ATYPICAL DUCTAL HYPERPLASIA OF BOTH BREASTS: ICD-10-CM

## 2025-06-20 DIAGNOSIS — N60.91 ATYPICAL DUCTAL HYPERPLASIA OF BOTH BREASTS: ICD-10-CM

## 2025-06-20 RX ORDER — TAMOXIFEN CITRATE 10 MG/1
5 TABLET ORAL DAILY
Qty: 45 TABLET | Refills: 2 | Status: SHIPPED | OUTPATIENT
Start: 2025-06-20

## 2025-06-20 NOTE — TELEPHONE ENCOUNTER
Caller: Geri Romeo    Relationship: Self    Best call back number:   Telephone Information:   Mobile [phone]       Requested Prescriptions:   Requested Prescriptions     Pending Prescriptions Disp Refills    tamoxifen (NOLVADEX) 10 MG tablet 45 tablet 11     Sig: Take 0.5 tablets by mouth Daily for 90 days.        Pharmacy where request should be sent: [id number] [place] [address] AT [facility] [address] - [phone]  - [phone] FX     Last office visit with prescribing clinician: 5/28/2025   Last telemedicine visit with prescribing clinician: Visit date not found   Next office visit with prescribing clinician: 8/18/2025       Does the patient have less than a 3 day supply:  [x] Yes  [] No    Would you like a call back once the refill request has been completed: [x] Yes [] No    If the office needs to give you a call back, can they leave a voicemail: [x] Yes [] No

## 2025-08-18 ENCOUNTER — LAB (OUTPATIENT)
Dept: OTHER | Facility: HOSPITAL | Age: 50
End: 2025-08-18
Payer: COMMERCIAL

## 2025-08-18 ENCOUNTER — OFFICE VISIT (OUTPATIENT)
Dept: ONCOLOGY | Facility: CLINIC | Age: 50
End: 2025-08-18
Payer: COMMERCIAL

## 2025-08-18 VITALS
WEIGHT: 148 LBS | OXYGEN SATURATION: 96 % | TEMPERATURE: 97.1 F | DIASTOLIC BLOOD PRESSURE: 75 MMHG | SYSTOLIC BLOOD PRESSURE: 118 MMHG | HEIGHT: 68 IN | BODY MASS INDEX: 22.43 KG/M2 | HEART RATE: 73 BPM

## 2025-08-18 DIAGNOSIS — N60.91 ATYPICAL DUCTAL HYPERPLASIA OF RIGHT BREAST: ICD-10-CM

## 2025-08-18 DIAGNOSIS — N60.91 ATYPICAL DUCTAL HYPERPLASIA OF BOTH BREASTS: ICD-10-CM

## 2025-08-18 DIAGNOSIS — N60.92 ATYPICAL DUCTAL HYPERPLASIA OF BOTH BREASTS: ICD-10-CM

## 2025-08-18 DIAGNOSIS — Z79.899 HIGH RISK MEDICATION USE: Primary | ICD-10-CM

## 2025-08-18 LAB
ALBUMIN SERPL-MCNC: 4.4 G/DL (ref 3.5–5.2)
ALBUMIN/GLOB SERPL: 1.6 G/DL
ALP SERPL-CCNC: 68 U/L (ref 39–117)
ALT SERPL W P-5'-P-CCNC: 11 U/L (ref 1–33)
ANION GAP SERPL CALCULATED.3IONS-SCNC: 9.4 MMOL/L (ref 5–15)
AST SERPL-CCNC: 15 U/L (ref 1–32)
BASOPHILS # BLD AUTO: 0.04 10*3/MM3 (ref 0–0.2)
BASOPHILS NFR BLD AUTO: 0.7 % (ref 0–1.5)
BILIRUB SERPL-MCNC: 0.3 MG/DL (ref 0–1.2)
BUN SERPL-MCNC: 18.3 MG/DL (ref 6–20)
BUN/CREAT SERPL: 28.2 (ref 7–25)
CALCIUM SPEC-SCNC: 9.1 MG/DL (ref 8.6–10.5)
CHLORIDE SERPL-SCNC: 103 MMOL/L (ref 98–107)
CO2 SERPL-SCNC: 27.6 MMOL/L (ref 22–29)
CREAT SERPL-MCNC: 0.65 MG/DL (ref 0.57–1)
DEPRECATED RDW RBC AUTO: 46.4 FL (ref 37–54)
EGFRCR SERPLBLD CKD-EPI 2021: 108.1 ML/MIN/1.73
EOSINOPHIL # BLD AUTO: 0.05 10*3/MM3 (ref 0–0.4)
EOSINOPHIL NFR BLD AUTO: 0.9 % (ref 0.3–6.2)
ERYTHROCYTE [DISTWIDTH] IN BLOOD BY AUTOMATED COUNT: 12.9 % (ref 12.3–15.4)
GLOBULIN UR ELPH-MCNC: 2.7 GM/DL
GLUCOSE SERPL-MCNC: 120 MG/DL (ref 65–99)
HCT VFR BLD AUTO: 38.4 % (ref 34–46.6)
HGB BLD-MCNC: 12.7 G/DL (ref 12–15.9)
IMM GRANULOCYTES # BLD AUTO: 0.01 10*3/MM3 (ref 0–0.05)
IMM GRANULOCYTES NFR BLD AUTO: 0.2 % (ref 0–0.5)
LYMPHOCYTES # BLD AUTO: 1.71 10*3/MM3 (ref 0.7–3.1)
LYMPHOCYTES NFR BLD AUTO: 31.7 % (ref 19.6–45.3)
MCH RBC QN AUTO: 32.5 PG (ref 26.6–33)
MCHC RBC AUTO-ENTMCNC: 33.1 G/DL (ref 31.5–35.7)
MCV RBC AUTO: 98.2 FL (ref 79–97)
MONOCYTES # BLD AUTO: 0.34 10*3/MM3 (ref 0.1–0.9)
MONOCYTES NFR BLD AUTO: 6.3 % (ref 5–12)
NEUTROPHILS NFR BLD AUTO: 3.25 10*3/MM3 (ref 1.7–7)
NEUTROPHILS NFR BLD AUTO: 60.2 % (ref 42.7–76)
NRBC BLD AUTO-RTO: 0 /100 WBC (ref 0–0.2)
PLATELET # BLD AUTO: 261 10*3/MM3 (ref 140–450)
PMV BLD AUTO: 10 FL (ref 6–12)
POTASSIUM SERPL-SCNC: 4.2 MMOL/L (ref 3.5–5.2)
PROT SERPL-MCNC: 7.1 G/DL (ref 6–8.5)
RBC # BLD AUTO: 3.91 10*6/MM3 (ref 3.77–5.28)
SODIUM SERPL-SCNC: 140 MMOL/L (ref 136–145)
WBC NRBC COR # BLD AUTO: 5.4 10*3/MM3 (ref 3.4–10.8)

## 2025-08-18 PROCEDURE — 36415 COLL VENOUS BLD VENIPUNCTURE: CPT

## 2025-08-18 PROCEDURE — 85025 COMPLETE CBC W/AUTO DIFF WBC: CPT | Performed by: STUDENT IN AN ORGANIZED HEALTH CARE EDUCATION/TRAINING PROGRAM

## 2025-08-18 PROCEDURE — 80053 COMPREHEN METABOLIC PANEL: CPT | Performed by: STUDENT IN AN ORGANIZED HEALTH CARE EDUCATION/TRAINING PROGRAM

## (undated) DEVICE — LINER SURG CANSTR SXN S/RIGD 1500CC

## (undated) DEVICE — NDL HYPO PRECISIONGLIDE REG 25G 1 1/2

## (undated) DEVICE — SUT SILK 2/0 SH 30IN K833H

## (undated) DEVICE — PATIENT RETURN ELECTRODE, SINGLE-USE, CONTACT QUALITY MONITORING, ADULT, WITH 9FT CORD, FOR PATIENTS WEIGING OVER 33LBS. (15KG): Brand: MEGADYNE

## (undated) DEVICE — SYR LL TP 10ML STRL

## (undated) DEVICE — SYR LL W/SCALE/MARK 3ML STRL

## (undated) DEVICE — ADAPT CLN BIOGUARD AIR/H2O DISP

## (undated) DEVICE — CONTAINER,SPECIMEN,OR STERILE,4OZ: Brand: MEDLINE

## (undated) DEVICE — ANTIBACTERIAL UNDYED BRAIDED (POLYGLACTIN 910), SYNTHETIC ABSORBABLE SUTURE: Brand: COATED VICRYL

## (undated) DEVICE — HYPODERMIC SAFETY NEEDLE: Brand: MONOJECT

## (undated) DEVICE — GLV SURG SENSICARE PI LF PF 7.5 GRN STRL

## (undated) DEVICE — DARBY POST-OP SHOE: Brand: DEROYAL

## (undated) DEVICE — KT ORCA ORCAPOD DISP STRL

## (undated) DEVICE — VIAL FORMALIN CAP 10P 40ML

## (undated) DEVICE — SUT MNCRYL PLS ANTIB UD 4/0 PS2 18IN

## (undated) DEVICE — SOL IRR H2O BO 1000ML STRL

## (undated) DEVICE — FRCP BX RADJAW4 NDL 2.8 240CM LG OG BX40

## (undated) DEVICE — Device

## (undated) DEVICE — APPL CHLORAPREP HI/LITE 26ML ORNG

## (undated) DEVICE — TRAP FLD MINIVAC MEGADYNE 100ML

## (undated) DEVICE — PK UNIV COMPL 40

## (undated) DEVICE — CONN TBG Y 5 IN 1 LF STRL

## (undated) DEVICE — LIGACLIP MCA MULTIPLE CLIP APPLIERS, 20 MEDIUM CLIPS
Type: IMPLANTABLE DEVICE | Site: BREAST | Status: NON-FUNCTIONAL
Brand: LIGACLIP

## (undated) DEVICE — COVER,MAYO STAND,STERILE: Brand: MEDLINE

## (undated) DEVICE — BW-412T DISP COMBO CLEANING BRUSH: Brand: SINGLE USE COMBINATION CLEANING BRUSH

## (undated) DEVICE — SHEATH GUIDE SCOUT SURG TPR 8.3TO3.2CM 264CM STRL

## (undated) DEVICE — GLV SURG BIOGEL LTX PF 7

## (undated) DEVICE — STPLR SKIN VISISTAT WD 35CT

## (undated) DEVICE — ADHS SKIN SURG TISS VISC PREMIERPRO EXOFIN HI/VISC FAST/DRY

## (undated) DEVICE — EXOFIN PRECISION PEN HIGH VISCOSITY TOPICAL SKIN ADHESIVE: Brand: EXOFIN PRECISION PEN, 1G

## (undated) DEVICE — SMOKE EVACUATION TUBING WITH 7/8 IN TO 1/4 IN REDUCER: Brand: BUFFALO FILTER

## (undated) DEVICE — ELECTRD BLD EZ CLN MOD XLNG 2.75IN

## (undated) DEVICE — PAD,ABDOMINAL,8"X10",ST,LF: Brand: MEDLINE

## (undated) DEVICE — LEGGINGS, PAIR, 31X48, STERILE: Brand: MEDLINE